# Patient Record
Sex: FEMALE | Race: WHITE | NOT HISPANIC OR LATINO | Employment: OTHER | URBAN - METROPOLITAN AREA
[De-identification: names, ages, dates, MRNs, and addresses within clinical notes are randomized per-mention and may not be internally consistent; named-entity substitution may affect disease eponyms.]

---

## 2018-06-13 ENCOUNTER — TRANSCRIBE ORDERS (OUTPATIENT)
Dept: ADMINISTRATIVE | Facility: HOSPITAL | Age: 68
End: 2018-06-13

## 2018-06-13 DIAGNOSIS — Z12.39 ENCOUNTER FOR OTHER SCREENING FOR MALIGNANT NEOPLASM OF BREAST: ICD-10-CM

## 2018-06-13 DIAGNOSIS — M81.0 SENILE OSTEOPOROSIS: Primary | ICD-10-CM

## 2018-06-13 DIAGNOSIS — Z00.00 ROUTINE GENERAL MEDICAL EXAMINATION AT A HEALTH CARE FACILITY: Primary | ICD-10-CM

## 2018-06-13 DIAGNOSIS — Z00.00 ROUTINE GENERAL MEDICAL EXAMINATION AT A HEALTH CARE FACILITY: ICD-10-CM

## 2022-10-17 ENCOUNTER — OFFICE VISIT (OUTPATIENT)
Dept: SURGERY | Facility: CLINIC | Age: 72
End: 2022-10-17

## 2022-10-17 VITALS
DIASTOLIC BLOOD PRESSURE: 72 MMHG | HEART RATE: 72 BPM | HEIGHT: 62 IN | BODY MASS INDEX: 26.87 KG/M2 | SYSTOLIC BLOOD PRESSURE: 128 MMHG | RESPIRATION RATE: 20 BRPM | WEIGHT: 146 LBS

## 2022-10-17 DIAGNOSIS — R19.7 DIARRHEA, UNSPECIFIED TYPE: ICD-10-CM

## 2022-10-17 DIAGNOSIS — K92.1 HEMATOCHEZIA: ICD-10-CM

## 2022-10-17 DIAGNOSIS — R15.9 INCONTINENCE OF FECES, UNSPECIFIED FECAL INCONTINENCE TYPE: Primary | ICD-10-CM

## 2022-10-17 PROCEDURE — 99203 OFFICE O/P NEW LOW 30 MIN: CPT | Performed by: SURGERY

## 2022-10-17 NOTE — H&P
Anita Neves 72 y.o. female presents @ the req of Dr. Hung for fecal incontinence.  Pt states she leaks stool, mucus, or blood all the time. Pt does not remember date of last c-scope. Pt also c/o bloating.           HPI   Above-noted agree.  This very pleasant 72-year-old female is here with some bowel issues.  She said about 1-1/2 years ago she developed issues with leakage.  She has to wear a pad because she leaks mucus all day.  She does move her bowels at least 2-3 times per day.  She occasionally has diarrhea as well.  She also has some bloating.  She tolerates a regular diet without nausea or vomiting.  She does not smoke or use tobacco products.  She had a colonoscopy greater than 10 years ago and she believes a polyp may have been removed.  She has no abdominal pain.  She has no other complaints.      Review of Systems   All other systems reviewed and are negative.            Current Outpatient Medications:   •  alendronate (FOSAMAX) 70 MG tablet, TAKE ONE TABLET BY MOUTH with 8oz of water on an empty stomach every Sunday, Disp: , Rfl:   •  ascorbic acid (QC Vitamin C) 1000 MG tablet, Take 1 tablet by mouth Daily., Disp: , Rfl:   •  aspirin 81 MG EC tablet, Take 1 tablet by mouth Daily., Disp: , Rfl:   •  atorvastatin (LIPITOR) 80 MG tablet, Take 80 mg by mouth Daily., Disp: , Rfl:   •  baclofen (LIORESAL) 10 MG tablet, TAKE ONE TABLET BY MOUTH THREE TIMES DAILY AS NEEDED FOR spasm, Disp: , Rfl:   •  ezetimibe (ZETIA) 10 MG tablet, Take 10 mg by mouth Daily., Disp: , Rfl:   •  famotidine (PEPCID) 20 MG tablet, Take 40 mg by mouth Daily., Disp: , Rfl:   •  gabapentin (NEURONTIN) 100 MG capsule, TAKE ONE CAPSULE BY MOUTH THREE TIMES DAILY FOR 30 DAYS, Disp: , Rfl:   •  Jardiance 10 MG tablet tablet, Take 10 mg by mouth Daily., Disp: , Rfl:   •  magnesium gluconate (MAGONATE) 500 MG tablet, Take 1 tablet by mouth 2 (Two) Times a Day., Disp: , Rfl:   •  METOPROLOL SUCCINATE PO, Take  by mouth., Disp: ,  Rfl:   •  montelukast (SINGULAIR) 10 MG tablet, Take 10 mg by mouth Daily., Disp: , Rfl:   •  Multiple Vitamins-Minerals (MULTI COMPLETE PO), Take  by mouth., Disp: , Rfl:   •  Omega-3 Fatty Acids (OMEGA 3 500 PO), Take  by mouth., Disp: , Rfl:   •  Onglyza 5 MG tablet, Take 5 mg by mouth Daily., Disp: , Rfl:   •  pioglitazone (ACTOS) 30 MG tablet, Take 1 tablet by mouth Daily., Disp: , Rfl:   •  potassium chloride (K-DUR,KLOR-CON) 20 MEQ CR tablet, Take 20 mEq by mouth Daily., Disp: , Rfl:   •  triamterene-hydrochlorothiazide (MAXZIDE-25) 37.5-25 MG per tablet, Take 1 tablet by mouth Daily., Disp: , Rfl:   •  TURMERIC PO, Take  by mouth., Disp: , Rfl:   •  Vitamin E ([employer] Vitamin E) 180 MG (400 UNIT) capsule capsule, Take  by mouth., Disp: , Rfl:         No Known Allergies        Past Medical History:   Diagnosis Date   • Diabetes (HCC)    • HLD (hyperlipidemia)    • Seasonal allergies            Past Surgical History:   Procedure Laterality Date   • BACK SURGERY     • BLADDER SUSPENSION     • COLONOSCOPY     • WRIST SURGERY             Social History     Tobacco Use   • Smoking status: Former     Types: Cigarettes   • Smokeless tobacco: Never   Vaping Use   • Vaping Use: Never used   Substance Use Topics   • Alcohol use: Never   • Drug use: Defer           Immunization History   Administered Date(s) Administered   • COVID-19 (ROCKY) 03/17/2021   • COVID-19 (PFIZER) BIVALENT BOOSTER 12+YRS 09/22/2022           Physical Exam  Vitals and nursing note reviewed.   Constitutional:       Appearance: Normal appearance.   HENT:      Head: Normocephalic and atraumatic.   Cardiovascular:      Rate and Rhythm: Normal rate and regular rhythm.   Pulmonary:      Effort: Pulmonary effort is normal.      Breath sounds: Normal breath sounds.   Abdominal:      General: Bowel sounds are normal.      Palpations: Abdomen is soft.   Musculoskeletal:         General: No swelling or tenderness.   Skin:     General: Skin is warm and dry.  "  Neurological:      General: No focal deficit present.      Mental Status: She is alert and oriented to person, place, and time.   Psychiatric:         Mood and Affect: Mood normal.         Behavior: Behavior normal.         Debilities/Disabilities Identified: None    Emotional Behavior: Appropriate      /72   Pulse 72   Resp 20   Ht 157.5 cm (62\")   Wt 66.2 kg (146 lb)   BMI 26.70 kg/m²         Diagnoses and all orders for this visit:    1. Incontinence of feces, unspecified fecal incontinence type (Primary)    2. Hematochezia    3. Diarrhea, unspecified type    We will get Anita scheduled for a colonoscopy.  I discussed with the patient the benefits and risks of performing endoscopy.  Benefits and risks were not limited to but including bleeding, infection, perforation, complications of anesthesia, aspiration.  The patient appeared to understand and is willing to proceed.    Thank you for allowing me to participate in the care of this interesting patient.            "

## 2022-10-21 ENCOUNTER — PATIENT ROUNDING (BHMG ONLY) (OUTPATIENT)
Dept: SURGERY | Facility: CLINIC | Age: 72
End: 2022-10-21

## 2022-10-21 NOTE — PROGRESS NOTES
October 21, 2022    Hello, may I speak with Anita Neves?    My name is Le Fletcher      I am  with [facility] SURG[facility] GENERAL SURG[facility]  [address] [address] [phone].    Before we get started may I verify your date of birth? 1950    I am calling to officially welcome you to our practice and ask about your recent visit. Is this a good time to talk? no    LVM for pt to call back with questions or concerns

## 2022-10-31 ENCOUNTER — ANESTHESIA EVENT (OUTPATIENT)
Dept: PERIOP | Facility: HOSPITAL | Age: 72
End: 2022-10-31

## 2022-11-01 ENCOUNTER — ANESTHESIA (OUTPATIENT)
Dept: PERIOP | Facility: HOSPITAL | Age: 72
End: 2022-11-01

## 2022-11-01 ENCOUNTER — ANESTHESIA EVENT (OUTPATIENT)
Dept: PERIOP | Facility: HOSPITAL | Age: 72
End: 2022-11-01

## 2022-11-01 ENCOUNTER — HOSPITAL ENCOUNTER (OUTPATIENT)
Facility: HOSPITAL | Age: 72
Discharge: HOME | End: 2022-11-02
Attending: SURGERY | Admitting: SURGERY

## 2022-11-01 DIAGNOSIS — R15.9 INCONTINENCE OF FECES, UNSPECIFIED FECAL INCONTINENCE TYPE: ICD-10-CM

## 2022-11-01 DIAGNOSIS — R19.7 DIARRHEA, UNSPECIFIED TYPE: ICD-10-CM

## 2022-11-01 DIAGNOSIS — K92.1 HEMATOCHEZIA: ICD-10-CM

## 2022-11-01 PROBLEM — K62.5 RECTAL BLEED: Status: ACTIVE | Noted: 2022-11-01

## 2022-11-01 LAB
ABO GROUP BLD: NORMAL
ABO GROUP BLD: NORMAL
BLD GP AB SCN SERPL QL: NEGATIVE
GLUCOSE BLDC GLUCOMTR-MCNC: 168 MG/DL (ref 70–130)
GLUCOSE BLDC GLUCOMTR-MCNC: 355 MG/DL (ref 70–130)
GLUCOSE BLDC GLUCOMTR-MCNC: 98 MG/DL (ref 70–130)
HBA1C MFR BLD: 9.5 % (ref 4.8–5.6)
HCT VFR BLD AUTO: 34.1 % (ref 34–46.6)
HCT VFR BLD AUTO: 44.2 % (ref 34–46.6)
HGB BLD-MCNC: 11.5 G/DL (ref 12–15.9)
HGB BLD-MCNC: 14.6 G/DL (ref 12–15.9)
RH BLD: NEGATIVE
RH BLD: NEGATIVE
T&S EXPIRATION DATE: NORMAL

## 2022-11-01 PROCEDURE — A9270 NON-COVERED ITEM OR SERVICE: HCPCS | Performed by: SURGERY

## 2022-11-01 PROCEDURE — 25010000002 ALBUMIN HUMAN 5% PER 50 ML: Performed by: NURSE ANESTHETIST, CERTIFIED REGISTERED

## 2022-11-01 PROCEDURE — 45385 COLONOSCOPY W/LESION REMOVAL: CPT | Performed by: SURGERY

## 2022-11-01 PROCEDURE — 88305 TISSUE EXAM BY PATHOLOGIST: CPT | Performed by: SURGERY

## 2022-11-01 PROCEDURE — 25010000002 PROPOFOL 200 MG/20ML EMULSION: Performed by: NURSE ANESTHETIST, CERTIFIED REGISTERED

## 2022-11-01 PROCEDURE — A9270 NON-COVERED ITEM OR SERVICE: HCPCS | Performed by: INTERNAL MEDICINE

## 2022-11-01 PROCEDURE — 63710000001 GABAPENTIN 100 MG CAPSULE: Performed by: SURGERY

## 2022-11-01 PROCEDURE — 63710000001 FAMOTIDINE 20 MG TABLET: Performed by: SURGERY

## 2022-11-01 PROCEDURE — 86900 BLOOD TYPING SEROLOGIC ABO: CPT | Performed by: SURGERY

## 2022-11-01 PROCEDURE — 63710000001 ATENOLOL 50 MG TABLET: Performed by: SURGERY

## 2022-11-01 PROCEDURE — G0378 HOSPITAL OBSERVATION PER HR: HCPCS

## 2022-11-01 PROCEDURE — 86900 BLOOD TYPING SEROLOGIC ABO: CPT

## 2022-11-01 PROCEDURE — 63710000001 POTASSIUM CHLORIDE 20 MEQ TABLET CONTROLLED-RELEASE: Performed by: SURGERY

## 2022-11-01 PROCEDURE — 85014 HEMATOCRIT: CPT | Performed by: SURGERY

## 2022-11-01 PROCEDURE — 82962 GLUCOSE BLOOD TEST: CPT

## 2022-11-01 PROCEDURE — P9041 ALBUMIN (HUMAN),5%, 50ML: HCPCS | Performed by: NURSE ANESTHETIST, CERTIFIED REGISTERED

## 2022-11-01 PROCEDURE — 86850 RBC ANTIBODY SCREEN: CPT | Performed by: SURGERY

## 2022-11-01 PROCEDURE — 99203 OFFICE O/P NEW LOW 30 MIN: CPT | Performed by: INTERNAL MEDICINE

## 2022-11-01 PROCEDURE — 63710000001 PIOGLITAZONE 30 MG TABLET: Performed by: SURGERY

## 2022-11-01 PROCEDURE — 86901 BLOOD TYPING SEROLOGIC RH(D): CPT | Performed by: SURGERY

## 2022-11-01 PROCEDURE — 63710000001 LINAGLIPTIN 5 MG TABLET: Performed by: SURGERY

## 2022-11-01 PROCEDURE — 85018 HEMOGLOBIN: CPT | Performed by: SURGERY

## 2022-11-01 PROCEDURE — 63710000001 INSULIN ASPART PER 5 UNITS: Performed by: INTERNAL MEDICINE

## 2022-11-01 PROCEDURE — 25010000002 CHLOROPROCAINE HCL (PF) 3 % SOLUTION: Performed by: NURSE ANESTHETIST, CERTIFIED REGISTERED

## 2022-11-01 PROCEDURE — 86901 BLOOD TYPING SEROLOGIC RH(D): CPT

## 2022-11-01 PROCEDURE — 63710000001 EMPAGLIFLOZIN 10 MG TABLET: Performed by: SURGERY

## 2022-11-01 PROCEDURE — 46614 ANOSCOPY CONTROL BLEEDING: CPT | Performed by: SURGERY

## 2022-11-01 PROCEDURE — 45380 COLONOSCOPY AND BIOPSY: CPT | Performed by: SURGERY

## 2022-11-01 PROCEDURE — 25010000002 CEFAZOLIN PER 500 MG: Performed by: NURSE ANESTHETIST, CERTIFIED REGISTERED

## 2022-11-01 PROCEDURE — 63710000001 TRIAMTERENE-HYDROCHLOROTHIAZIDE 37.5-25 MG TABLET: Performed by: SURGERY

## 2022-11-01 PROCEDURE — 83036 HEMOGLOBIN GLYCOSYLATED A1C: CPT | Performed by: INTERNAL MEDICINE

## 2022-11-01 DEVICE — FLOSEAL HEMOSTATIC MATRIX, 5ML
Type: IMPLANTABLE DEVICE | Site: PERIANAL | Status: FUNCTIONAL
Brand: FLOSEAL HEMOSTATIC MATRIX

## 2022-11-01 DEVICE — HEMOST ABS SURGIFOAM SZ100 8X12 10MM: Type: IMPLANTABLE DEVICE | Site: PERIANAL | Status: FUNCTIONAL

## 2022-11-01 RX ORDER — SODIUM CHLORIDE, SODIUM LACTATE, POTASSIUM CHLORIDE, CALCIUM CHLORIDE 600; 310; 30; 20 MG/100ML; MG/100ML; MG/100ML; MG/100ML
100 INJECTION, SOLUTION INTRAVENOUS CONTINUOUS
Status: DISCONTINUED | OUTPATIENT
Start: 2022-11-01 | End: 2022-11-01

## 2022-11-01 RX ORDER — FAMOTIDINE 20 MG/1
40 TABLET, FILM COATED ORAL DAILY
Status: DISCONTINUED | OUTPATIENT
Start: 2022-11-01 | End: 2022-11-02 | Stop reason: HOSPADM

## 2022-11-01 RX ORDER — FENTANYL CITRATE 50 UG/ML
25 INJECTION, SOLUTION INTRAMUSCULAR; INTRAVENOUS
Status: DISCONTINUED | OUTPATIENT
Start: 2022-11-01 | End: 2022-11-01 | Stop reason: HOSPADM

## 2022-11-01 RX ORDER — SODIUM CHLORIDE 0.9 % (FLUSH) 0.9 %
10 SYRINGE (ML) INJECTION EVERY 12 HOURS SCHEDULED
Status: DISCONTINUED | OUTPATIENT
Start: 2022-11-01 | End: 2022-11-01 | Stop reason: HOSPADM

## 2022-11-01 RX ORDER — ONDANSETRON 2 MG/ML
4 INJECTION INTRAMUSCULAR; INTRAVENOUS ONCE AS NEEDED
Status: DISCONTINUED | OUTPATIENT
Start: 2022-11-01 | End: 2022-11-01 | Stop reason: HOSPADM

## 2022-11-01 RX ORDER — POTASSIUM CHLORIDE 20 MEQ/1
20 TABLET, EXTENDED RELEASE ORAL DAILY
Status: DISCONTINUED | OUTPATIENT
Start: 2022-11-01 | End: 2022-11-02 | Stop reason: HOSPADM

## 2022-11-01 RX ORDER — ALBUMIN, HUMAN INJ 5% 5 %
SOLUTION INTRAVENOUS CONTINUOUS PRN
Status: DISCONTINUED | OUTPATIENT
Start: 2022-11-01 | End: 2022-11-01 | Stop reason: SURG

## 2022-11-01 RX ORDER — LIDOCAINE HYDROCHLORIDE AND EPINEPHRINE 20; 5 MG/ML; UG/ML
INJECTION, SOLUTION EPIDURAL; INFILTRATION; INTRACAUDAL; PERINEURAL AS NEEDED
Status: DISCONTINUED | OUTPATIENT
Start: 2022-11-01 | End: 2022-11-01 | Stop reason: HOSPADM

## 2022-11-01 RX ORDER — SODIUM CHLORIDE 9 MG/ML
40 INJECTION, SOLUTION INTRAVENOUS AS NEEDED
Status: DISCONTINUED | OUTPATIENT
Start: 2022-11-01 | End: 2022-11-01 | Stop reason: HOSPADM

## 2022-11-01 RX ORDER — LIDOCAINE HYDROCHLORIDE 10 MG/ML
0.5 INJECTION, SOLUTION EPIDURAL; INFILTRATION; INTRACAUDAL; PERINEURAL ONCE AS NEEDED
Status: COMPLETED | OUTPATIENT
Start: 2022-11-01 | End: 2022-11-01

## 2022-11-01 RX ORDER — GABAPENTIN 100 MG/1
100 CAPSULE ORAL EVERY 8 HOURS SCHEDULED
Status: DISCONTINUED | OUTPATIENT
Start: 2022-11-01 | End: 2022-11-02 | Stop reason: HOSPADM

## 2022-11-01 RX ORDER — HYDROCORTISONE 25 MG/G
CREAM TOPICAL AS NEEDED
Status: DISCONTINUED | OUTPATIENT
Start: 2022-11-01 | End: 2022-11-01 | Stop reason: HOSPADM

## 2022-11-01 RX ORDER — SODIUM CHLORIDE 0.9 % (FLUSH) 0.9 %
10 SYRINGE (ML) INJECTION AS NEEDED
Status: DISCONTINUED | OUTPATIENT
Start: 2022-11-01 | End: 2022-11-01 | Stop reason: HOSPADM

## 2022-11-01 RX ORDER — DEXTROSE MONOHYDRATE 25 G/50ML
25 INJECTION, SOLUTION INTRAVENOUS
Status: DISCONTINUED | OUTPATIENT
Start: 2022-11-01 | End: 2022-11-02 | Stop reason: HOSPADM

## 2022-11-01 RX ORDER — INSULIN ASPART 100 [IU]/ML
0-9 INJECTION, SOLUTION INTRAVENOUS; SUBCUTANEOUS
Status: DISCONTINUED | OUTPATIENT
Start: 2022-11-01 | End: 2022-11-02 | Stop reason: HOSPADM

## 2022-11-01 RX ORDER — PIOGLITAZONEHYDROCHLORIDE 30 MG/1
30 TABLET ORAL DAILY
Status: DISCONTINUED | OUTPATIENT
Start: 2022-11-01 | End: 2022-11-01

## 2022-11-01 RX ORDER — CEFAZOLIN SODIUM 1 G/3ML
INJECTION, POWDER, FOR SOLUTION INTRAMUSCULAR; INTRAVENOUS AS NEEDED
Status: DISCONTINUED | OUTPATIENT
Start: 2022-11-01 | End: 2022-11-01 | Stop reason: SURG

## 2022-11-01 RX ORDER — MAGNESIUM HYDROXIDE 1200 MG/15ML
LIQUID ORAL AS NEEDED
Status: DISCONTINUED | OUTPATIENT
Start: 2022-11-01 | End: 2022-11-01 | Stop reason: HOSPADM

## 2022-11-01 RX ORDER — NICOTINE POLACRILEX 4 MG
15 LOZENGE BUCCAL
Status: DISCONTINUED | OUTPATIENT
Start: 2022-11-01 | End: 2022-11-02 | Stop reason: HOSPADM

## 2022-11-01 RX ORDER — TRIAMTERENE AND HYDROCHLOROTHIAZIDE 37.5; 25 MG/1; MG/1
1 TABLET ORAL DAILY
Status: DISCONTINUED | OUTPATIENT
Start: 2022-11-01 | End: 2022-11-02 | Stop reason: HOSPADM

## 2022-11-01 RX ORDER — ATENOLOL 50 MG/1
50 TABLET ORAL
Status: DISCONTINUED | OUTPATIENT
Start: 2022-11-01 | End: 2022-11-02 | Stop reason: HOSPADM

## 2022-11-01 RX ORDER — CHLOROPROCAINE HYDROCHLORIDE 30 MG/ML
INJECTION, SOLUTION EPIDURAL; INFILTRATION; INTRACAUDAL; PERINEURAL AS NEEDED
Status: DISCONTINUED | OUTPATIENT
Start: 2022-11-01 | End: 2022-11-01 | Stop reason: SURG

## 2022-11-01 RX ORDER — PROPOFOL 10 MG/ML
INJECTION, EMULSION INTRAVENOUS AS NEEDED
Status: DISCONTINUED | OUTPATIENT
Start: 2022-11-01 | End: 2022-11-01 | Stop reason: SURG

## 2022-11-01 RX ORDER — SODIUM CHLORIDE, SODIUM LACTATE, POTASSIUM CHLORIDE, CALCIUM CHLORIDE 600; 310; 30; 20 MG/100ML; MG/100ML; MG/100ML; MG/100ML
9 INJECTION, SOLUTION INTRAVENOUS CONTINUOUS
Status: DISCONTINUED | OUTPATIENT
Start: 2022-11-01 | End: 2022-11-01

## 2022-11-01 RX ADMIN — SODIUM CHLORIDE, POTASSIUM CHLORIDE, SODIUM LACTATE AND CALCIUM CHLORIDE 9 ML/HR: 600; 310; 30; 20 INJECTION, SOLUTION INTRAVENOUS at 11:48

## 2022-11-01 RX ADMIN — LIDOCAINE HYDROCHLORIDE 50 ML: 10 INJECTION, SOLUTION EPIDURAL; INFILTRATION; INTRACAUDAL; PERINEURAL at 09:52

## 2022-11-01 RX ADMIN — FAMOTIDINE 40 MG: 20 TABLET ORAL at 15:56

## 2022-11-01 RX ADMIN — PROPOFOL 50 MG: 10 INJECTION, EMULSION INTRAVENOUS at 10:11

## 2022-11-01 RX ADMIN — PIOGLITAZONE HYDROCHLORIDE 30 MG: 30 TABLET ORAL at 15:56

## 2022-11-01 RX ADMIN — PROPOFOL 50 MG: 10 INJECTION, EMULSION INTRAVENOUS at 10:21

## 2022-11-01 RX ADMIN — GABAPENTIN 100 MG: 100 CAPSULE ORAL at 15:56

## 2022-11-01 RX ADMIN — ALBUMIN (HUMAN): 12.5 SOLUTION INTRAVENOUS at 12:12

## 2022-11-01 RX ADMIN — LINAGLIPTIN 5 MG: 5 TABLET, FILM COATED ORAL at 15:56

## 2022-11-01 RX ADMIN — PROPOFOL 50 MG: 10 INJECTION, EMULSION INTRAVENOUS at 10:08

## 2022-11-01 RX ADMIN — INSULIN ASPART 8 UNITS: 100 INJECTION, SOLUTION INTRAVENOUS; SUBCUTANEOUS at 17:43

## 2022-11-01 RX ADMIN — PROPOFOL 50 MG: 10 INJECTION, EMULSION INTRAVENOUS at 10:31

## 2022-11-01 RX ADMIN — TRIAMTERENE AND HYDROCHLOROTHIAZIDE 1 TABLET: 37.5; 25 TABLET ORAL at 15:56

## 2022-11-01 RX ADMIN — CEFAZOLIN 2 G: 1 INJECTION, POWDER, FOR SOLUTION INTRAMUSCULAR; INTRAVENOUS at 12:18

## 2022-11-01 RX ADMIN — SODIUM CHLORIDE, POTASSIUM CHLORIDE, SODIUM LACTATE AND CALCIUM CHLORIDE 9 ML/HR: 600; 310; 30; 20 INJECTION, SOLUTION INTRAVENOUS at 09:39

## 2022-11-01 RX ADMIN — EMPAGLIFLOZIN 10 MG: 10 TABLET, FILM COATED ORAL at 15:56

## 2022-11-01 RX ADMIN — PROPOFOL 50 MG: 10 INJECTION, EMULSION INTRAVENOUS at 10:04

## 2022-11-01 RX ADMIN — PROPOFOL 50 MG: 10 INJECTION, EMULSION INTRAVENOUS at 10:27

## 2022-11-01 RX ADMIN — ATENOLOL 50 MG: 50 TABLET ORAL at 15:56

## 2022-11-01 RX ADMIN — PROPOFOL 75 MG: 10 INJECTION, EMULSION INTRAVENOUS at 09:54

## 2022-11-01 RX ADMIN — PROPOFOL 25 MG: 10 INJECTION, EMULSION INTRAVENOUS at 09:57

## 2022-11-01 RX ADMIN — POTASSIUM CHLORIDE 20 MEQ: 1500 TABLET, EXTENDED RELEASE ORAL at 15:56

## 2022-11-01 RX ADMIN — PROPOFOL 50 MG: 10 INJECTION, EMULSION INTRAVENOUS at 10:00

## 2022-11-01 RX ADMIN — PROPOFOL 50 MG: 10 INJECTION, EMULSION INTRAVENOUS at 10:14

## 2022-11-01 RX ADMIN — PROPOFOL 50 MG: 10 INJECTION, EMULSION INTRAVENOUS at 10:34

## 2022-11-01 RX ADMIN — PROPOFOL 50 MG: 10 INJECTION, EMULSION INTRAVENOUS at 10:18

## 2022-11-01 RX ADMIN — CHLOROPROCAINE HYDROCHLORIDE 2 MG: 30 INJECTION, SOLUTION EPIDURAL; INFILTRATION; INTRACAUDAL; PERINEURAL at 11:52

## 2022-11-01 RX ADMIN — GABAPENTIN 100 MG: 100 CAPSULE ORAL at 21:10

## 2022-11-01 NOTE — CONSULTS
[facility] HOSPITALIST CONSULT NOTE    Padmini King APRN    CONSULT REASON:  Medical management    HISTORY OF PRESENT ILLNESS:    Patient is a 72-year-old female with past medical history significant for diabetes mellitus type 2 non-insulin-requiring, obesity, hyperlipidemia, hypertension.  She presented to [facility] ED under care of Dr. Potts for colonoscopy procedure today.  Patient developed post colonoscopy rectal bleed and therefore was admitted to the hospital for observation.  Patient reports a history of diabetes, hypertension hyperlipidemia as per above.  She takes only oral hypoglycemic agents at home.  We discussed need for insulin while inpatient.  Patient denies abdominal pain, nausea, vomiting, diarrhea.       Past Medical History:   Diagnosis Date   • Diabetes mellitus (HCC)    • Hyperlipidemia    • Hypertension    • Insomnia    • Irritable bowel disease    • Kidney stone      Past Surgical History:   Procedure Laterality Date   • ENDOSCOPY     • ENDOSCOPY N/A 12/31/2019    Procedure: ESOPHAGOGASTRODUODENOSCOPY WITH BIOPSY;  Surgeon: Deniz Zeng Jr., MD;  [facility]: [facility] ENDOSCOPY;  Service: General   • GASTRIC BYPASS     • LASIK     • WRIST FRACTURE SURGERY Left      Family History   Problem Relation Age of Onset   • Diabetes Mother    • Sleep apnea Mother    • Obesity Father    • Hypertension Father    • Heart attack Father    • Obesity Brother    • Hypertension Brother      Social History     Tobacco Use   • Smoking status: Never Smoker   • Smokeless tobacco: Never Used   Vaping Use   • Vaping Use: Never used   Substance Use Topics   • Alcohol use: Yes     Comment: once every 3 months   • Drug use: No     Medications Prior to Admission   Medication Sig Dispense Refill Last Dose   • colestipol (COLESTID) 1 g tablet Take 1 tablet by mouth 2 (Two) Times a Day. 60 tablet 6 Past Week at Unknown time   • desonide (DESOWEN) 0.05 % cream APPLY 1 CREAM  TOPICALLY TWICE DAILY   4/21/2022 at Unknown time   • melatonin 5 MG tablet tablet Take 5 mg by mouth Every Night.   4/21/2022 at Unknown time   • multivitamin with minerals tablet tablet Take 1 tablet by mouth Daily.   4/21/2022 at Unknown time   • propranolol LA (Inderal LA) 60 MG 24 hr capsule Take 1 capsule by mouth Every Night. 30 capsule 0 4/21/2022 at Unknown time   • SITagliptin (Januvia) 100 MG tablet Take 1 tablet by mouth Daily. For diabetes 30 tablet 0 4/21/2022 at Unknown time   • venlafaxine 225 MG tablet sustained-release 24 hour 24 hr tablet Take 1 tablet by mouth Daily. 90 each 1 4/21/2022 at Unknown time   • zolpidem (AMBIEN) 10 MG tablet Take 1 tablet by mouth At Night As Needed for Sleep. 90 tablet 0 4/21/2022 at Unknown time   • dicyclomine (BENTYL) 20 MG tablet Take 1 tablet by mouth 3 (Three) Times a Day. 90 tablet 3 4/20/2022     Allergies:  Glucophage [metformin] and Trazodone    Immunization History   Administered Date(s) Administered   • COVID-19 (MODERNA) 1st, 2nd, 3rd Dose Only 01/05/2021, 02/02/2021   • Flu Vaccine Intradermal Quad 18-64YR 10/13/2017   • Flu Vaccine Quad PF >36MO 10/19/2016, 10/15/2019, 11/12/2020   • FluLaval/Fluarix/Fluzone >6 10/15/2019   • Hepatitis A 05/11/2018   • Pneumococcal Polysaccharide (PPSV23) 10/19/2016   • Tdap 10/19/2016       REVIEW OF SYSTEMS:  Please see the above history of present illness for pertinent positives and negatives.  The remainder of the patient's systems have been reviewed and are negative.     Vital Signs  Temp:  [98.2 °F (36.8 °C)] 98.2 °F (36.8 °C)  Heart Rate:  [76] 76  Resp:  [20] 20  BP: (118)/(87) 118/87  Flowsheet Rows      Flowsheet Row First Filed Value   Admission Height --   Admission Weight 146 kg (321 lb 12.8 oz) Documented at 04/22/2022 0740             Physical Exam:  General: Patient is awake and alert.  Obese elderly female. No acute distress noted.   HENT: Head is atraumatic, normocephalic. Hearing is grossly intact.  Nose is without obvious congestion and appears patent. Neck is supple and trachea is midline.   Eyes: Vision is grossly intact. Pupils appear equal and round.   Cardiovascular: Heart has regular rate and rhythm with no murmurs, rubs or gallops noted.   Respiratory: Lungs are clear to ausculation without wheezes, rhonchi or rales.   Abdominal/GI: Soft, nontender, bowel sounds present. No rebound or guarding present.   Extremities: No peripheral edema noted.   Musculoskeletal: Spontaneous movement of bilateral upper and lower extremities against gravity noted. No signs of injury or deformity noted.   Skin: Warm and dry.   Psych: Mood and affect are appropriate. Cooperative with exam.   Neuro: No facial asymmetry noted. No focal deficits noted, hearing and vision are grossly intact.       Results Review:    I reviewed the patient's new clinical results.  Lab Results (most recent)       Procedure Component Value Units Date/Time    POC Glucose Once [[id number]]  (Abnormal) Collected: 04/22/22 0809    Specimen: Blood Updated: 04/22/22 0816     Glucose 183 mg/dL      Comment: Meter: [id number] : [id number] Alondra Huertas RN (PRN)               Imaging Results (Most Recent)       None          None    ECG/EMG Results (most recent)       None              Assessment/Plan   DM2 in obese female with diabetic peripheral neuropathy:  Hold home regimen   Add accuchecks every 6 hours, low dose SSI  -Continue home gabapentin  -Check A1c.     HTN: Continue home Maxide metoprolol succinate    Osteoporosis-continue Fosamax on discharge    Hyperlipidemia-continue atorvastatin    GERD-continue Pepcid    Seasonal allergic rhinitis-hold home Singulair    Post colonoscopy rectal bleed-management as per general surgery    I discussed the patient's findings and my recommendations with patient..     Flavio Peguero DO  10/17/2022   17:24 EDT     At [facility], we believe that sharing information builds trust and better  "relationships. You are receiving this note because you recently visited [facility]. It is possible you will see health information before a provider has talked with you about it. This kind of information can be easy to misunderstand. To help you fully understand what it means for your health, we urge you to discuss this note with your provider.     \"Dictated utilizing Dragon dictation\"        "

## 2022-11-01 NOTE — NURSING NOTE
Patients rectum had much bleeding with large clots- 4 more changes of pads in addition to first 2- large amount of blood- dark red- with clots- last 2 changes had more blood and less clots- dr morales was notified

## 2022-11-01 NOTE — ANESTHESIA PREPROCEDURE EVALUATION
Anesthesia Evaluation     Patient summary reviewed and Nursing notes reviewed   no history of anesthetic complications:  NPO Solid Status: > 8 hours  NPO Liquid Status: > 8 hours           Airway   Mallampati: II  TM distance: >3 FB  Neck ROM: full  No difficulty expected  Dental      Pulmonary - negative pulmonary ROS    breath sounds clear to auscultation  Cardiovascular   Exercise tolerance: good (4-7 METS)    ECG reviewed  PT is on anticoagulation therapy  Rhythm: regular  Rate: normal    (+) dysrhythmias Tachycardia, hyperlipidemia,       Neuro/Psych- negative ROS  GI/Hepatic/Renal/Endo    (+)   diabetes mellitus type 2 well controlled,     Musculoskeletal (-) negative ROS    Abdominal    Substance History - negative use     OB/GYN          Other - negative ROS                       Anesthesia Plan    ASA 2     MAC     intravenous induction     Anesthetic plan, risks, benefits, and alternatives have been provided, discussed and informed consent has been obtained with: patient.    Use of blood products discussed with patient  Consented to blood products.       CODE STATUS:

## 2022-11-01 NOTE — ANESTHESIA POSTPROCEDURE EVALUATION
Patient: Anita Neves    Procedure Summary     Date: 11/01/22 Room / Location: [facility] OR 1 /  [facility] OR    Anesthesia Start: 1200 Anesthesia Stop: 1249    Procedure: RECTAL EXAM UNDER ANESTHESIA, CONTROL OF BLEEDING (Anus) Diagnosis: (Rectal bleeding)    Surgeons: Angy Potts DO Provider: Abigail Magdaleno CRNA    Anesthesia Type: spinal ASA Status: 2 - Emergent          Anesthesia Type: spinal    Vitals  Vitals Value Taken Time   /67 11/01/22 1340   Temp 97.5 °F (36.4 °C) 11/01/22 1305   Pulse 80 11/01/22 1343   Resp 12 11/01/22 1340   SpO2 97 % 11/01/22 1343   Vitals shown include unvalidated device data.        Post Anesthesia Care and Evaluation    Patient location during evaluation: PACU  Patient participation: complete - patient participated  Level of consciousness: awake  Pain management: adequate    Airway patency: patent  Anesthetic complications: No anesthetic complications  PONV Status: none  Cardiovascular status: acceptable  Respiratory status: acceptable  Hydration status: acceptable       Topical Ketoconazole Counseling: Patient counseled that this medication may cause skin irritation or allergic reactions.  In the event of skin irritation, the patient was advised to reduce the amount of the drug applied or use it less frequently.   The patient verbalized understanding of the proper use and possible adverse effects of ketoconazole.  All of the patient's questions and concerns were addressed.

## 2022-11-01 NOTE — ANESTHESIA PROCEDURE NOTES
Spinal Block      Patient reassessed immediately prior to procedure    Patient location during procedure: pre-op  Start Time: 11/1/2022 11:50 AM  Stop Time: 11/1/2022 11:52 AM  Indication:at surgeon's request and procedure for pain  Performed By  CRNA/CAA: Abigail Magdaleno CRNA  Preanesthetic Checklist  Completed: patient identified, IV checked, site marked, risks and benefits discussed, surgical consent, monitors and equipment checked, pre-op evaluation and timeout performed  Spinal Block Prep:  Patient Position:left lateral decubitus  Sterile Tech:cap, gloves, mask and sterile barriers  Prep:Betadine  Patient Monitoring:blood pressure monitoring, continuous pulse oximetry and EKG    Spinal Block Procedure  Approach:midline  Guidance:landmark technique and palpation technique  Location:L3-L4  Needle Type:Pencan  Needle Gauge:27 G  Placement of Spinal needle event:cerebrospinal fluid aspirated  Paresthesia: no  Fluid Appearance:clear     Post Assessment  Patient Tolerance:patient tolerated the procedure well with no apparent complications  Complications no  Additional Notes  Lidocaine 2% 1ml skin infiltration

## 2022-11-01 NOTE — PLAN OF CARE
Goal Outcome Evaluation:  Plan of Care Reviewed With: patient        Progress: improving  Outcome Evaluation: New admit from PACU. Colonscopy today with some bleeding. Very little blood wiped since patient came to the floor. Hospitalist consulted for diabetes management. Reed catheter taken out per MD. Up to toilet and voided. A&Ox4. SCDs in place. No complaints of pain. Daughters at bedside.

## 2022-11-01 NOTE — PROGRESS NOTES
Anita was getting ready to eat [employer].  She has some burning in her rectum but is otherwise doing well.  She has had no further bleeding.  Her blood pressure is stable.  We will remove her meyers after she eats.

## 2022-11-01 NOTE — OP NOTE
Colonoscopy Procedure Note      Pre-operative Diagnosis: Fecal incontinence, hematochezia, diarrhea    Post-operative Diagnosis: Diverticulosis, polyps of the cecum and right colon, very large rectal polyp/mass adjacent to the anal sphincters    Procedure: Colonoscopy with polypectomy using hot snare and cold forceps with biopsy of rectal mass using Bovie cautery    Surgeon: Delroy    Anesthetic: Abigail Marc CRNA    Estimated Blood Loss: 50 mL    Complications: None    Indications: See preoperative diagnosis     Findings/Treatments:  Cecum polyp-removed cold forceps  Right colon polyps x2-removed cold forceps  There were 2 large rectal polyps-one was able to be removed with hot snare the other was too large to remove with hot snare we did remove several pieces with hot snare but then attempted to remove the polyp with Bovie cautery using a rectal speculum.  Were unable to remove the entire rectal polyp/mass.  She will be referred to a colorectal surgeon for additional treatment.       Scope Withdrawal Time:  > 6 minutes      Recommendations: We will be referring Anita to colorectal surgery for further treatment of her rectal mass-pathology pending      Procedure Details     After discussing the benefits and risks of the procedure with the patient, not limited to but including:  Bleeding, infection, perforation, aspiration; informed consent was signed.  The patient was taken into the endoscopy room at [facility] and placed in the left lateral decubitus position.  MAC anesthesia was given with appropriate cardiopulmonary monitoring.  A rectal exam was performed.  Sphincter tone was normal.  The colonoscope was then inserted and carefully advanced to the cecum while visualizing the mucosa.  The cecum was identified by the ileocecal valve and the orifice of the appendix.  Once in the cecum the scope was slowly withdrawn while carefully evaluating mucosa and deflating air.  There was a  cecum polyp removed with cold forceps and 2 right colon polyps medical forceps.  In the rectum there were 2 polyps.  There was a large polyp about 5 cm from the sinkers that removed with hot snare.  There was a very large mass of the rectum that was adjacent to the sphincters.  Multiple biopsies and pieces that were obtained.  We gotten some bleeding so we used the Bovie cautery as well as Floseal and Gelfoam for hemostasis.  Once hemostasis was assured Anita was taken to the recovery area in stable postoperative condition having tolerated her procedure well.    Angy Potts,

## 2022-11-01 NOTE — ANESTHESIA POSTPROCEDURE EVALUATION
Patient: Anita Neves    Procedure Summary     Date: 11/01/22 Room / Location: [facility] ENDOSCOPY 1 /  [facility] OR    Anesthesia Start: 0951 Anesthesia Stop: 1043    Procedure: COLONOSCOPY with possible biopsy or polypectomy Diagnosis:       Incontinence of feces, unspecified fecal incontinence type      Hematochezia      Diarrhea, unspecified type      (Incontinence of feces, unspecified fecal incontinence type [R15.9])      (Hematochezia [K92.1])      (Diarrhea, unspecified type [R19.7])    Surgeons: Angy Potts DO Provider: Abigail Magdaleno CRNA    Anesthesia Type: MAC ASA Status: 2          Anesthesia Type: MAC    Vitals  Vitals Value Taken Time   /80 11/01/22 1100   Temp     Pulse 74 11/01/22 1100   Resp 12 11/01/22 1100   SpO2 97 % 11/01/22 1100           Post Anesthesia Care and Evaluation    Patient location during evaluation: PHASE II  Patient participation: complete - patient participated  Level of consciousness: awake and alert  Pain score: 0  Pain management: adequate    Airway patency: patent  Anesthetic complications: No anesthetic complications  PONV Status: none  Cardiovascular status: acceptable  Respiratory status: acceptable  Hydration status: acceptable

## 2022-11-01 NOTE — OP NOTE
GENERAL SURGERY :  Delroy  Anita Nvees  1950    Procedure Date: 11/01/22    Pre-op Diagnosis: Post colonoscopy rectal bleed    Post-op Diagnosis: Post colonoscopy rectal bleed secondary to rectal mass    Procedure: Rectal exam under anesthesia with control rectal bleeding    Surgeon: Delroy    Assistant: None    Estimated Blood Loss: Minimal bleeding in the operating room    Complications: None    Specimen: None    Findings: Anita had a rectal mass that was adjacent to the anal sphincters.  There was bleeding of the surface area of the mass.  We were able to obtain hemostasis using 2-0 chromic sutures as well as Floseal.    Clinical Note: Anita had a colonoscopy where a large rectal mass was noted.  Multiple biopsies were obtained with hot snare and using Bovie cautery.  She had significant bleeding in her recovery room so we discussed taking her back to the operating room emergently for control of hemostasis.    Procedure: Anita was given a spinal anesthesia in the preoperative area by anesthesia.  She was then brought into the operating room and placed in supine position.  She was in prepped and draped you sterile fashion.  After appropriate timeout a speculum was inserted the bleeding was noted.  Copious irrigation was carried out.  Using 2-0 chromic I ran a running suture basically connecting mucosa over the surface bleeding.  We then placed Floseal.  I then placed some simple sutures using 2-0 chromic.  We then waited 10 minutes.  No further bleeding was noted.  Anita was cleaned up and an ABD pad was placed over her anal area and she was taken to the recovery room where a Reed catheter was to be placed.  She tolerated procedure well without complication.        Angy Potts,   12:56 EDT

## 2022-11-01 NOTE — PROGRESS NOTES
Were notified by recovery that Anita was having a large amount of bleeding per rectum.  I discussed with Anita and her daughter taking her back to surgery into the OR and performing a rectal exam under anesthesia for control of rectal bleeding.  Informed consent was signed.  All other questions were answered.  Anesthesia came in to do a spinal immediately after I discussed with the family.  She will be taken to the OR as soon as the room is opened.

## 2022-11-02 VITALS
WEIGHT: 151.4 LBS | RESPIRATION RATE: 15 BRPM | TEMPERATURE: 98.7 F | HEART RATE: 75 BPM | BODY MASS INDEX: 27.86 KG/M2 | HEIGHT: 62 IN | SYSTOLIC BLOOD PRESSURE: 111 MMHG | OXYGEN SATURATION: 97 % | DIASTOLIC BLOOD PRESSURE: 58 MMHG

## 2022-11-02 LAB
GLUCOSE BLDC GLUCOMTR-MCNC: 169 MG/DL (ref 70–130)
HCT VFR BLD AUTO: 32.7 % (ref 34–46.6)
HGB BLD-MCNC: 10.8 G/DL (ref 12–15.9)
LAB AP CASE REPORT: NORMAL
PATH REPORT.FINAL DX SPEC: NORMAL
PATH REPORT.GROSS SPEC: NORMAL

## 2022-11-02 PROCEDURE — A9270 NON-COVERED ITEM OR SERVICE: HCPCS | Performed by: SURGERY

## 2022-11-02 PROCEDURE — A9270 NON-COVERED ITEM OR SERVICE: HCPCS | Performed by: INTERNAL MEDICINE

## 2022-11-02 PROCEDURE — 63710000001 FAMOTIDINE 20 MG TABLET: Performed by: SURGERY

## 2022-11-02 PROCEDURE — G0378 HOSPITAL OBSERVATION PER HR: HCPCS

## 2022-11-02 PROCEDURE — 63710000001 ATENOLOL 50 MG TABLET: Performed by: SURGERY

## 2022-11-02 PROCEDURE — 85014 HEMATOCRIT: CPT | Performed by: SURGERY

## 2022-11-02 PROCEDURE — 63710000001 INSULIN ASPART PER 5 UNITS: Performed by: INTERNAL MEDICINE

## 2022-11-02 PROCEDURE — 85018 HEMOGLOBIN: CPT | Performed by: SURGERY

## 2022-11-02 PROCEDURE — 63710000001 POTASSIUM CHLORIDE 20 MEQ TABLET CONTROLLED-RELEASE: Performed by: SURGERY

## 2022-11-02 PROCEDURE — 82962 GLUCOSE BLOOD TEST: CPT

## 2022-11-02 PROCEDURE — 63710000001 GABAPENTIN 100 MG CAPSULE: Performed by: SURGERY

## 2022-11-02 PROCEDURE — 63710000001 TRIAMTERENE-HYDROCHLOROTHIAZIDE 37.5-25 MG TABLET: Performed by: SURGERY

## 2022-11-02 RX ADMIN — GABAPENTIN 100 MG: 100 CAPSULE ORAL at 06:21

## 2022-11-02 RX ADMIN — INSULIN ASPART 2 UNITS: 100 INJECTION, SOLUTION INTRAVENOUS; SUBCUTANEOUS at 07:30

## 2022-11-02 RX ADMIN — POTASSIUM CHLORIDE 20 MEQ: 1500 TABLET, EXTENDED RELEASE ORAL at 08:41

## 2022-11-02 RX ADMIN — TRIAMTERENE AND HYDROCHLOROTHIAZIDE 1 TABLET: 37.5; 25 TABLET ORAL at 08:41

## 2022-11-02 RX ADMIN — FAMOTIDINE 40 MG: 20 TABLET ORAL at 08:41

## 2022-11-02 RX ADMIN — ATENOLOL 50 MG: 50 TABLET ORAL at 08:41

## 2022-11-02 NOTE — PROGRESS NOTES
You're welcome.  They just uploaded the pictures.  I am so happy she did not have cancer.  She's a sweet lady.

## 2022-11-02 NOTE — PROGRESS NOTES
Patient: Anita Neves    @A@    Anesthesia Type: MAC  Patient location: [facility] Surgical Floor  Last vitals  BP      Temp      Pulse     Resp      SpO2        Post vital signs: stable  Level of consciousness: awake, alert and oriented    Post-anesthesia pain: adequate analgesia  Airway patency: patent  Respiratory: unassisted  Cardiovascular: stable and blood pressure at baseline  Hydration: euvolemic    Difficult Airway: no  Anesthetic complications: no

## 2022-11-02 NOTE — CASE MANAGEMENT/SOCIAL WORK
Case Management Discharge Note      Final Note: dc home         Selected Continued Care - Discharged on 11/2/2022 Admission date: 11/1/2022 - Discharge disposition: Home or Self Care    Destination    No services have been selected for the patient.              Durable Medical Equipment    No services have been selected for the patient.              Dialysis/Infusion    No services have been selected for the patient.              Home Medical Care    No services have been selected for the patient.              Therapy    No services have been selected for the patient.              Community Resources    No services have been selected for the patient.              Community & DME    No services have been selected for the patient.                       Final Discharge Disposition Code: 01 - home or self-care

## 2022-11-02 NOTE — PROGRESS NOTES
You are going to be seeing this sweet lady on Friday.  She has a big rectal polyp that I could not remove completely and then had to take her to surgery to control her bleeding.  I'm actually surprised the pathology did not show cancer.  Feel free to call or text with any questions.

## 2022-11-02 NOTE — PAYOR COMM NOTE
"Anita Pressley (72 y.o. Female)     ATTN: NURSE REVIEWER  RE: OBSERVATION ADMIT - PRECERT REQUEST FOR PROCEDURES  REF#[id number]  PLS REPLY TO PAULO MADRID [phone] FAX# [phone]      Date of Birth   1950    Social Security Number       Address   [address] [place] [address]    Home Phone   [phone]    MRN   [id number]       Worship   None    Marital Status   Single                            Admission Date   11/1/22    Admission Type   Elective    Admitting Provider   Angy Potts DO    Attending Provider       Department, Room/Bed   [facility] MED SURG, 1418/1       Discharge Date   11/2/2022    Discharge Disposition   Home or Self Care    Discharge Destination                               Attending Provider: (none)   Allergies: No Known Allergies    Isolation: None   Infection: None   Code Status: Not on file    Ht: 157.5 cm (62\")   Wt: 68.7 kg (151 lb 6.4 oz)    Admission Cmt: None   Principal Problem: Rectal bleed [K62.5]                 Active Insurance as of 11/1/2022     Primary Coverage     Payor Plan Insurance Group Employer/Plan Group    [facility] MEDICARE REPLACEMENT [employer] MEDICARE REPLACEMENT      Payor Plan Address Payor Plan Phone Number Payor Plan Fax Number Effective Dates    [address] [address] [phone]  8/1/2021 - None Entered    [facility] [address]       Subscriber Name Subscriber Birth Date Member ID       ANITA PRESSLEY 1950 [id number]           Secondary Coverage     Payor Plan Insurance Group Employer/Plan Group    [facility] BY LEAH [facility] BY LEAH [id number]     Payor Plan Address Payor Plan Phone Number Payor Plan Fax Number Effective Dates    [address] [address]   1/1/2021 - None Entered    [facility] [address]       Subscriber Name Subscriber Birth Date Member ID       ANITA PRESSLEY 1950 [id number]                 Emergency Contacts      (Rel.) Home Phone Work Phone " Mobile Phone    maren dempsey (Daughter) [phone] -- --               History & Physical      Angy Potts DO at 11/01/22 0853          Anita Neves 72 y.o. female presents @ the req of Dr. Hung for fecal incontinence.  Pt states she leaks stool, mucus, or blood all the time. Pt does not remember date of last c-scope. Pt also c/o bloating.           HPI   Above-noted agree.  This very pleasant 72-year-old female is here with some bowel issues.  She said about 1-1/2 years ago she developed issues with leakage.  She has to wear a pad because she leaks mucus all day.  She does move her bowels at least 2-3 times per day.  She occasionally has diarrhea as well.  She also has some bloating.  She tolerates a regular diet without nausea or vomiting.  She does not smoke or use tobacco products.  She had a colonoscopy greater than 10 years ago and she believes a polyp may have been removed.  She has no abdominal pain.  She has no other complaints.      Review of Systems   All other systems reviewed and are negative.            Current Outpatient Medications:   •  alendronate (FOSAMAX) 70 MG tablet, TAKE ONE TABLET BY MOUTH with 8oz of water on an empty stomach every Sunday, Disp: , Rfl:   •  ascorbic acid (QC Vitamin C) 1000 MG tablet, Take 1 tablet by mouth Daily., Disp: , Rfl:   •  aspirin 81 MG EC tablet, Take 1 tablet by mouth Daily., Disp: , Rfl:   •  atorvastatin (LIPITOR) 80 MG tablet, Take 80 mg by mouth Daily., Disp: , Rfl:   •  baclofen (LIORESAL) 10 MG tablet, TAKE ONE TABLET BY MOUTH THREE TIMES DAILY AS NEEDED FOR spasm, Disp: , Rfl:   •  ezetimibe (ZETIA) 10 MG tablet, Take 10 mg by mouth Daily., Disp: , Rfl:   •  famotidine (PEPCID) 20 MG tablet, Take 40 mg by mouth Daily., Disp: , Rfl:   •  gabapentin (NEURONTIN) 100 MG capsule, TAKE ONE CAPSULE BY MOUTH THREE TIMES DAILY FOR 30 DAYS, Disp: , Rfl:   •  Jardiance 10 MG tablet tablet, Take 10 mg by mouth Daily., Disp: , Rfl:   •  magnesium  gluconate (MAGONATE) 500 MG tablet, Take 1 tablet by mouth 2 (Two) Times a Day., Disp: , Rfl:   •  METOPROLOL SUCCINATE PO, Take  by mouth., Disp: , Rfl:   •  montelukast (SINGULAIR) 10 MG tablet, Take 10 mg by mouth Daily., Disp: , Rfl:   •  Multiple Vitamins-Minerals (MULTI COMPLETE PO), Take  by mouth., Disp: , Rfl:   •  Omega-3 Fatty Acids (OMEGA 3 500 PO), Take  by mouth., Disp: , Rfl:   •  Onglyza 5 MG tablet, Take 5 mg by mouth Daily., Disp: , Rfl:   •  pioglitazone (ACTOS) 30 MG tablet, Take 1 tablet by mouth Daily., Disp: , Rfl:   •  potassium chloride (K-DUR,KLOR-CON) 20 MEQ CR tablet, Take 20 mEq by mouth Daily., Disp: , Rfl:   •  triamterene-hydrochlorothiazide (MAXZIDE-25) 37.5-25 MG per tablet, Take 1 tablet by mouth Daily., Disp: , Rfl:   •  TURMERIC PO, Take  by mouth., Disp: , Rfl:   •  Vitamin E ([employer] Vitamin E) 180 MG (400 UNIT) capsule capsule, Take  by mouth., Disp: , Rfl:         No Known Allergies        Past Medical History:   Diagnosis Date   • Diabetes (HCC)    • HLD (hyperlipidemia)    • Seasonal allergies            Past Surgical History:   Procedure Laterality Date   • BACK SURGERY     • BLADDER SUSPENSION     • COLONOSCOPY     • WRIST SURGERY             Social History     Tobacco Use   • Smoking status: Former     Types: Cigarettes   • Smokeless tobacco: Never   Vaping Use   • Vaping Use: Never used   Substance Use Topics   • Alcohol use: Never   • Drug use: Defer           Immunization History   Administered Date(s) Administered   • COVID-19 ("[employer]") 03/17/2021   • COVID-19 (PFIZER) BIVALENT BOOSTER 12+YRS 09/22/2022           Physical Exam  Vitals and nursing note reviewed.   Constitutional:       Appearance: Normal appearance.   HENT:      Head: Normocephalic and atraumatic.   Cardiovascular:      Rate and Rhythm: Normal rate and regular rhythm.   Pulmonary:      Effort: Pulmonary effort is normal.      Breath sounds: Normal breath sounds.   Abdominal:      General: Bowel sounds are  "normal.      Palpations: Abdomen is soft.   Musculoskeletal:         General: No swelling or tenderness.   Skin:     General: Skin is warm and dry.   Neurological:      General: No focal deficit present.      Mental Status: She is alert and oriented to person, place, and time.   Psychiatric:         Mood and Affect: Mood normal.         Behavior: Behavior normal.         Debilities/Disabilities Identified: None    Emotional Behavior: Appropriate      /72   Pulse 72   Resp 20   Ht 157.5 cm (62\")   Wt 66.2 kg (146 lb)   BMI 26.70 kg/m²         Diagnoses and all orders for this visit:    1. Incontinence of feces, unspecified fecal incontinence type (Primary)    2. Hematochezia    3. Diarrhea, unspecified type    We will get Anita scheduled for a colonoscopy.  I discussed with the patient the benefits and risks of performing endoscopy.  Benefits and risks were not limited to but including bleeding, infection, perforation, complications of anesthesia, aspiration.  The patient appeared to understand and is willing to proceed.    Thank you for allowing me to participate in the care of this interesting patient.              Electronically signed by Angy Potts DO at 11/01/22 0845       Operative/Procedure Notes (last 24 hours)  Notes from 11/01/22 1638 through 11/02/22 1638   No notes of this type exist for this encounter.            Physician Progress Notes (last 24 hours)      Angy Potts DO at 11/02/22 1140        3 year repeat   Electronically signed by Angy Potts DO at 11/02/22 1341     Angy Potts DO at 11/02/22 1140        You're welcome.  They just uploaded the pictures.  I am so happy she did not have cancer.  She's a sweet lady.  Electronically signed by Angy Potts DO at 11/02/22 1341     Stephanie Wilson MD at 11/02/22 1140        Thanks for sending it my way    Electronically signed by Stephanie Wilson MD at 11/02/22 1340     Angy Potts DO at " 11/02/22 1140        You are going to be seeing this sweet lady on Friday.  She has a big rectal polyp that I could not remove completely and then had to take her to surgery to control her bleeding.  I'm actually surprised the pathology did not show cancer.  Feel free to call or text with any questions.  Electronically signed by Angy Potts DO at 11/02/22 1248     Flavio Peguero DO at 11/02/22 0729        Chart reviewed by me today.  Patient's vital signs stable.  Patient's blood glucose currently at goal.  Anticipate transition back to home oral medications at discharge.  Nursing notes reviewed from overnight and appears rectal bleeding has slowed.  Patient anticipating discharge home today, his position as per surgery.  No changes to current medicine plan.     Electronically signed by Flavio Peguero DO at 11/02/22 0730          Consult Notes (last 24 hours)      Flavio Peguero DO at 11/01/22 1724      Consult Orders    1. Inpatient Hospitalist Consult [[id number]] ordered by Angy Potts DO at 11/01/22 1717               [facility] HOSPITALIST CONSULT NOTE    Padmini King APRN    CONSULT REASON:  Medical management    HISTORY OF PRESENT ILLNESS:    Patient is a 72-year-old female with past medical history significant for diabetes mellitus type 2 non-insulin-requiring, obesity, hyperlipidemia, hypertension.  She presented to [facility] ED under care of Dr. Potts for colonoscopy procedure today.  Patient developed post colonoscopy rectal bleed and therefore was admitted to the hospital for observation.  Patient reports a history of diabetes, hypertension hyperlipidemia as per above.  She takes only oral hypoglycemic agents at home.  We discussed need for insulin while inpatient.  Patient denies abdominal pain, nausea, vomiting, diarrhea.       Past Medical History:   Diagnosis Date   • Diabetes mellitus (HCC)    • Hyperlipidemia    • Hypertension     • Insomnia    • Irritable bowel disease    • Kidney stone      Past Surgical History:   Procedure Laterality Date   • ENDOSCOPY     • ENDOSCOPY N/A 12/31/2019    Procedure: ESOPHAGOGASTRODUODENOSCOPY WITH BIOPSY;  Surgeon: Deniz Zeng Jr., MD;  Location: [facility] ENDOSCOPY;  Service: General   • GASTRIC BYPASS     • LASIK     • WRIST FRACTURE SURGERY Left      Family History   Problem Relation Age of Onset   • Diabetes Mother    • Sleep apnea Mother    • Obesity Father    • Hypertension Father    • Heart attack Father    • Obesity Brother    • Hypertension Brother      Social History     Tobacco Use   • Smoking status: Never Smoker   • Smokeless tobacco: Never Used   Vaping Use   • Vaping Use: Never used   Substance Use Topics   • Alcohol use: Yes     Comment: once every 3 months   • Drug use: No     Medications Prior to Admission   Medication Sig Dispense Refill Last Dose   • colestipol (COLESTID) 1 g tablet Take 1 tablet by mouth 2 (Two) Times a Day. 60 tablet 6 Past Week at Unknown time   • desonide (DESOWEN) 0.05 % cream APPLY 1 CREAM TOPICALLY TWICE DAILY   4/21/2022 at Unknown time   • melatonin 5 MG tablet tablet Take 5 mg by mouth Every Night.   4/21/2022 at Unknown time   • multivitamin with minerals tablet tablet Take 1 tablet by mouth Daily.   4/21/2022 at Unknown time   • propranolol LA (Inderal LA) 60 MG 24 hr capsule Take 1 capsule by mouth Every Night. 30 capsule 0 4/21/2022 at Unknown time   • SITagliptin (Januvia) 100 MG tablet Take 1 tablet by mouth Daily. For diabetes 30 tablet 0 4/21/2022 at Unknown time   • venlafaxine 225 MG tablet sustained-release 24 hour 24 hr tablet Take 1 tablet by mouth Daily. 90 each 1 4/21/2022 at Unknown time   • zolpidem (AMBIEN) 10 MG tablet Take 1 tablet by mouth At Night As Needed for Sleep. 90 tablet 0 4/21/2022 at Unknown time   • dicyclomine (BENTYL) 20 MG tablet Take 1 tablet by mouth 3 (Three) Times a Day. 90 tablet 3 4/20/2022      Allergies:  Glucophage [metformin] and Trazodone    Immunization History   Administered Date(s) Administered   • COVID-19 (MODERNA) 1st, 2nd, 3rd Dose Only 01/05/2021, 02/02/2021   • Flu Vaccine Intradermal Quad 18-64YR 10/13/2017   • Flu Vaccine Quad PF >36MO 10/19/2016, 10/15/2019, 11/12/2020   • FluLaval/Fluarix/Fluzone >6 10/15/2019   • Hepatitis A 05/11/2018   • Pneumococcal Polysaccharide (PPSV23) 10/19/2016   • Tdap 10/19/2016       REVIEW OF SYSTEMS:  Please see the above history of present illness for pertinent positives and negatives.  The remainder of the patient's systems have been reviewed and are negative.     Vital Signs  Temp:  [98.2 °F (36.8 °C)] 98.2 °F (36.8 °C)  Heart Rate:  [76] 76  Resp:  [20] 20  BP: (118)/(87) 118/87  Flowsheet Rows      Flowsheet Row First Filed Value   Admission Height --   Admission Weight 146 kg (321 lb 12.8 oz) Documented at 04/22/2022 0740             Physical Exam:  General: Patient is awake and alert.  Obese elderly female. No acute distress noted.   HENT: Head is atraumatic, normocephalic. Hearing is grossly intact. Nose is without obvious congestion and appears patent. Neck is supple and trachea is midline.   Eyes: Vision is grossly intact. Pupils appear equal and round.   Cardiovascular: Heart has regular rate and rhythm with no murmurs, rubs or gallops noted.   Respiratory: Lungs are clear to ausculation without wheezes, rhonchi or rales.   Abdominal/GI: Soft, nontender, bowel sounds present. No rebound or guarding present.   Extremities: No peripheral edema noted.   Musculoskeletal: Spontaneous movement of bilateral upper and lower extremities against gravity noted. No signs of injury or deformity noted.   Skin: Warm and dry.   Psych: Mood and affect are appropriate. Cooperative with exam.   Neuro: No facial asymmetry noted. No focal deficits noted, hearing and vision are grossly intact.       Results Review:    I reviewed the patient's new clinical  "results.  Lab Results (most recent)       Procedure Component Value Units Date/Time    POC Glucose Once [[id number]]  (Abnormal) Collected: 04/22/22 0809    Specimen: Blood Updated: 04/22/22 0816     Glucose 183 mg/dL      Comment: Meter: [id number] : [id number] Alondra Huertas RN (PRN)               Imaging Results (Most Recent)       None          None    ECG/EMG Results (most recent)       None              Assessment/Plan   DM2 in obese female with diabetic peripheral neuropathy:  Hold home regimen   Add accuchecks every 6 hours, low dose SSI  -Continue home gabapentin  -Check A1c.     HTN: Continue home Maxide metoprolol succinate    Osteoporosis-continue Fosamax on discharge    Hyperlipidemia-continue atorvastatin    GERD-continue Pepcid    Seasonal allergic rhinitis-hold home Singulair    Post colonoscopy rectal bleed-management as per general surgery    I discussed the patient's findings and my recommendations with patient..     Flavio Peguero DO  10/17/2022   17:24 EDT     At [facility], we believe that sharing information builds trust and better relationships. You are receiving this note because you recently visited [facility]. It is possible you will see health information before a provider has talked with you about it. This kind of information can be easy to misunderstand. To help you fully understand what it means for your health, we urge you to discuss this note with your provider.     \"Dictated utilizing Dragon dictation\"          Electronically signed by Flavio Peguero DO at 11/01/22 1755       "

## 2022-11-02 NOTE — PLAN OF CARE
Goal Outcome Evaluation:  Plan of Care Reviewed With: patient        Progress: improving  Outcome Evaluation: VSS, rested well, bleeding slowed through the night, up with stand by assist, anticipates going home today

## 2022-11-02 NOTE — DISCHARGE SUMMARY
Discharge Summary    Patient name: Anita Neves    Medical record number: [id number]    Admission date: 11/1/2022  Discharge date:  11/2/2022    Attending physician: Dr. LINCOLN Potts    Primary care physician: Dennis Hung MD    Referring physician: Angy Potts DO  [address] [place] [address]    Consulting physician(s):  Internal Medicine     Condition on discharge: stable    Primary Diagnoses:  Rectal bleed    Secondary Diagnoses:     Operative Procedure: Colonoscopy with polypectomy, Rectal examination under anesthesia with control of hemostasis     Hospital Course: The patient is a very pleasant 72 y.o. female that was admitted to the hospital after having an emergency surgery for a rectal bleed.  She has a rectal mass that was biopsied during a colonoscopy and it bled which required going to the operating room to control the bleeding.  She had no further episodes of bleeding but her glucose was over 300 so internal medicine was consulted.  Her hemoglobin and glucose were stable on discharge.  Her pathology is pending.    Discharge medications:      Discharge Medications      Continue These Medications      Instructions Start Date   alendronate 70 MG tablet  Commonly known as: FOSAMAX   TAKE ONE TABLET BY MOUTH with 8oz of water on an empty stomach every Sunday      ascorbic acid 1000 MG tablet  Commonly known as: VITAMIN C   1,000 mg, Oral, Daily      aspirin 81 MG EC tablet   81 mg, Oral, Daily      atorvastatin 80 MG tablet  Commonly known as: LIPITOR   80 mg, Oral, Daily      baclofen 10 MG tablet  Commonly known as: LIORESAL   1 tablet 2 (Two) Times a Day.      ezetimibe 10 MG tablet  Commonly known as: ZETIA   10 mg, Oral, Daily      famotidine 20 MG tablet  Commonly known as: PEPCID   40 mg, Oral, Daily      gabapentin 100 MG capsule  Commonly known as: NEURONTIN   1 capsule 2 (Two) Times a Day.      Jardiance 10 MG tablet tablet  Generic drug: empagliflozin   10  mg, Oral, Daily      magnesium gluconate 500 MG tablet  Commonly known as: MAGONATE   27 mg, Oral, 2 Times Daily      METOPROLOL SUCCINATE PO   50 mg, Oral, Daily      montelukast 10 MG tablet  Commonly known as: SINGULAIR   10 mg, Oral, Daily      MULTI COMPLETE PO   Oral, Daily      OMEGA 3 500 PO   Oral, 2 Times Daily      Onglyza 5 MG tablet  Generic drug: SAXagliptin   5 mg, Oral, Daily      pioglitazone 30 MG tablet  Commonly known as: ACTOS   30 mg, Oral, Daily      potassium chloride 20 MEQ CR tablet  Commonly known as: K-DUR,KLOR-CON   20 mEq, Oral, Daily      triamterene-hydrochlorothiazide 37.5-25 MG per tablet  Commonly known as: MAXZIDE-25   1 tablet, Oral, Daily      TURMERIC PO   Oral, 2 Times Daily      Vitamin E 180 MG (400 UNIT) capsule capsule   2 capsules, Oral, 2 Times Daily             Discharge instructions:    Take one tablespoon of Metamucil daily    Follow-up appointment: Follow up with Dr. Wilson in the office on Friday. Call for questions at [phone].

## 2022-11-02 NOTE — PROGRESS NOTES
Chart reviewed by me today.  Patient's vital signs stable.  Patient's blood glucose currently at goal.  Anticipate transition back to home oral medications at discharge.  Nursing notes reviewed from overnight and appears rectal bleeding has slowed.  Patient anticipating discharge home today, his position as per surgery.  No changes to current medicine plan.

## 2022-11-03 ENCOUNTER — READMISSION MANAGEMENT (OUTPATIENT)
Dept: CALL CENTER | Facility: HOSPITAL | Age: 72
End: 2022-11-03

## 2022-11-03 NOTE — OUTREACH NOTE
Prep Survey    Flowsheet Row Responses   [facility] facility patient discharged from? [place]   Is LACE score < 7 ? No   Emergency Room discharge w/ pulse ox? No   Eligibility Readm Mgmt   Discharge diagnosis Rectal bleed   Does the patient have one of the following disease processes/diagnoses(primary or secondary)? Other   Does the patient have Home health ordered? No   Is there a DME ordered? No   Prep survey completed? Yes          NKECHI YUEN - Registered Nurse

## 2022-11-04 ENCOUNTER — OFFICE VISIT (OUTPATIENT)
Dept: SURGERY | Facility: CLINIC | Age: 72
End: 2022-11-04

## 2022-11-04 ENCOUNTER — READMISSION MANAGEMENT (OUTPATIENT)
Dept: CALL CENTER | Facility: HOSPITAL | Age: 72
End: 2022-11-04

## 2022-11-04 VITALS
WEIGHT: 151.2 LBS | HEIGHT: 62 IN | BODY MASS INDEX: 27.82 KG/M2 | SYSTOLIC BLOOD PRESSURE: 110 MMHG | HEART RATE: 80 BPM | DIASTOLIC BLOOD PRESSURE: 64 MMHG | TEMPERATURE: 97.6 F | OXYGEN SATURATION: 99 %

## 2022-11-04 DIAGNOSIS — K62.1 RECTAL POLYP: Primary | ICD-10-CM

## 2022-11-04 PROBLEM — I10 ESSENTIAL HYPERTENSION: Status: ACTIVE | Noted: 2022-11-04

## 2022-11-04 PROBLEM — E11.9 TYPE 2 DIABETES MELLITUS WITHOUT COMPLICATION: Status: ACTIVE | Noted: 2022-11-04

## 2022-11-04 PROBLEM — M81.0 AGE RELATED OSTEOPOROSIS: Status: ACTIVE | Noted: 2022-11-04

## 2022-11-04 PROBLEM — E11.42 DIABETIC PERIPHERAL NEUROPATHY ASSOCIATED WITH TYPE 2 DIABETES MELLITUS: Status: ACTIVE | Noted: 2022-11-04

## 2022-11-04 PROBLEM — M17.9 OSTEOARTHRITIS OF KNEE: Status: ACTIVE | Noted: 2022-11-04

## 2022-11-04 PROBLEM — H90.3 BILATERAL SENSORINEURAL HEARING LOSS: Status: ACTIVE | Noted: 2022-11-04

## 2022-11-04 PROBLEM — K21.9 GASTRO-ESOPHAGEAL REFLUX DISEASE WITHOUT ESOPHAGITIS: Status: ACTIVE | Noted: 2022-11-04

## 2022-11-04 PROBLEM — R91.1 SOLITARY PULMONARY NODULE: Status: ACTIVE | Noted: 2022-11-04

## 2022-11-04 PROBLEM — J30.9 ALLERGIC RHINITIS: Status: ACTIVE | Noted: 2022-11-04

## 2022-11-04 PROBLEM — H90.0 CONDUCTIVE HEARING LOSS, BILATERAL: Status: ACTIVE | Noted: 2022-11-04

## 2022-11-04 PROBLEM — G57.60 PLANTAR NERVE LESION: Status: ACTIVE | Noted: 2022-11-04

## 2022-11-04 PROBLEM — E55.9 VITAMIN D DEFICIENCY: Status: ACTIVE | Noted: 2022-11-04

## 2022-11-04 PROBLEM — E78.2 MIXED HYPERLIPIDEMIA: Status: ACTIVE | Noted: 2022-11-04

## 2022-11-04 PROCEDURE — 99204 OFFICE O/P NEW MOD 45 MIN: CPT | Performed by: COLON & RECTAL SURGERY

## 2022-11-04 RX ORDER — CEFAZOLIN SODIUM 2 G/100ML
2 INJECTION, SOLUTION INTRAVENOUS ONCE
Status: CANCELLED | OUTPATIENT
Start: 2022-12-01 | End: 2022-11-04

## 2022-11-04 RX ORDER — METRONIDAZOLE 500 MG/1
500 TABLET ORAL 2 TIMES DAILY
COMMUNITY
Start: 2022-08-19 | End: 2022-11-04

## 2022-11-04 RX ORDER — SODIUM CHLORIDE 0.9 % (FLUSH) 0.9 %
10 SYRINGE (ML) INJECTION EVERY 12 HOURS SCHEDULED
Status: CANCELLED | OUTPATIENT
Start: 2022-12-01

## 2022-11-04 RX ORDER — SODIUM CHLORIDE 0.9 % (FLUSH) 0.9 %
10 SYRINGE (ML) INJECTION AS NEEDED
Status: CANCELLED | OUTPATIENT
Start: 2022-12-01

## 2022-11-04 RX ORDER — NIRMATRELVIR AND RITONAVIR 300-100 MG
3 KIT ORAL 2 TIMES DAILY
COMMUNITY
Start: 2022-10-02 | End: 2022-11-04

## 2022-11-04 RX ORDER — AMOXICILLIN 500 MG/1
CAPSULE ORAL
COMMUNITY
Start: 2022-08-19 | End: 2022-11-04

## 2022-11-04 RX ORDER — CLARITHROMYCIN 500 MG/1
500 TABLET, COATED ORAL EVERY 12 HOURS
COMMUNITY
Start: 2022-08-19 | End: 2022-11-04

## 2022-11-04 RX ORDER — AZITHROMYCIN 250 MG/1
TABLET, FILM COATED ORAL
COMMUNITY
Start: 2022-10-04 | End: 2022-11-04

## 2022-11-04 RX ORDER — METRONIDAZOLE 500 MG/100ML
500 INJECTION, SOLUTION INTRAVENOUS ONCE
Status: CANCELLED | OUTPATIENT
Start: 2022-12-01 | End: 2022-11-04

## 2022-11-04 RX ORDER — PREDNISONE 10 MG/1
TABLET ORAL
COMMUNITY
Start: 2022-10-04 | End: 2022-11-04

## 2022-11-04 NOTE — PROGRESS NOTES
Anita Neves is a 72 y.o. female who is seen as a consult at the request of Angy Potts DO for Rectal Bleeding (Patient here today with Both daughter's Nicole Wu & Sandy Espino, & Granddaughter Laya Humphries.).      HPI:  The patient states she had some bleeding this morning when she had a bowel movement, however; it was not much. She states that before she had her colonoscopy, she would have fresh blood every once in a while, when she would wipe after a bowel movement. The patient denies any pain. The adult female states that the patient has been having some leakage. The patient's daughter reports that she has been leaking for years. The patient states that she leaks poop and blood, it is slimy in consistency. She reports that she is using a panty liner or a pad.     Past Medical History:   Diagnosis Date   • Diabetes (HCC)    • HLD (hyperlipidemia)    • Seasonal allergies    • Tachycardia determined by examination of pulse     on metoprolol       Past Surgical History:   Procedure Laterality Date   • BACK SURGERY     • BLADDER SUSPENSION     • COLONOSCOPY     • COLONOSCOPY W/ POLYPECTOMY N/A 11/1/2022    Procedure: COLONOSCOPY with polypectomy;  Surgeon: Angy Potts DO;  Location: [facility] OR;  Service: Gastroenterology;  Laterality: N/A;  DIVERTICULOSIS  CECAL POLYP  RIGHT COLON POLYPS X2  CECAL TIME AT 1002  RECTAL POLYPS X2- HOT SNARE   • RECTAL EXAMINATION UNDER ANESTHESIA N/A 11/1/2022    Procedure: RECTAL EXAM UNDER ANESTHESIA, CONTROL OF BLEEDING;  Surgeon: Angy Potts DO;  Location: [facility];  Service: General;  Laterality: N/A;   • WRIST SURGERY         Social History:   reports that she has quit smoking. Her smoking use included cigarettes. She has been exposed to tobacco smoke. She has never used smokeless tobacco. Drug use questions deferred to the physician. She reports that she does not drink alcohol.      Marriage status: Single    Family History   Problem  Relation Age of Onset   • Diabetes Mother    • Diabetes Sister          Current Outpatient Medications:   •  alendronate (FOSAMAX) 70 MG tablet, TAKE ONE TABLET BY MOUTH with 8oz of water on an empty stomach every Sunday, Disp: , Rfl:   •  ascorbic acid (VITAMIN C) 1000 MG tablet, Take 1 tablet by mouth Daily., Disp: , Rfl:   •  aspirin 81 MG EC tablet, Take 1 tablet by mouth Daily., Disp: , Rfl:   •  atorvastatin (LIPITOR) 80 MG tablet, Take 80 mg by mouth Daily., Disp: , Rfl:   •  baclofen (LIORESAL) 10 MG tablet, 1 tablet 2 (Two) Times a Day., Disp: , Rfl:   •  ezetimibe (ZETIA) 10 MG tablet, Take 10 mg by mouth Daily., Disp: , Rfl:   •  famotidine (PEPCID) 20 MG tablet, Take 40 mg by mouth Daily., Disp: , Rfl:   •  gabapentin (NEURONTIN) 100 MG capsule, 1 capsule 2 (Two) Times a Day., Disp: , Rfl:   •  Jardiance 10 MG tablet tablet, Take 10 mg by mouth Daily., Disp: , Rfl:   •  magnesium gluconate (MAGONATE) 500 MG tablet, Take 1 tablet by mouth 2 (Two) Times a Day., Disp: , Rfl:   •  METOPROLOL SUCCINATE PO, Take 50 mg by mouth Daily., Disp: , Rfl:   •  montelukast (SINGULAIR) 10 MG tablet, Take 10 mg by mouth Daily., Disp: , Rfl:   •  Multiple Vitamins-Minerals (MULTI COMPLETE PO), Take  by mouth Daily., Disp: , Rfl:   •  Omega-3 Fatty Acids (OMEGA 3 500 PO), Take  by mouth 2 (Two) Times a Day., Disp: , Rfl:   •  Onglyza 5 MG tablet, Take 5 mg by mouth Daily., Disp: , Rfl:   •  pioglitazone (ACTOS) 30 MG tablet, Take 1 tablet by mouth Daily., Disp: , Rfl:   •  Pioglitazone HCl-metFORMIN HCl (PIOGLITAZONE HCL-METFORMIN ER PO), , Disp: , Rfl:   •  potassium chloride (K-DUR,KLOR-CON) 20 MEQ CR tablet, Take 20 mEq by mouth Daily., Disp: , Rfl:   •  triamterene-hydrochlorothiazide (MAXZIDE-25) 37.5-25 MG per tablet, Take 1 tablet by mouth Daily., Disp: , Rfl:   •  TURMERIC PO, Take  by mouth 2 (Two) Times a Day., Disp: , Rfl:   •  Vitamin E 180 MG (400 UNIT) capsule capsule, Take 2 capsules by mouth 2 (Two) Times a  Day., Disp: , Rfl:   No current facility-administered medications for this visit.    Allergy  Patient has no known allergies.    Review of Systems   Constitutional: Positive for decreased appetite. Negative for weight gain.   HENT: Negative for congestion, hearing loss and hoarse voice.    Eyes: Negative for blurred vision, discharge and visual disturbance.   Cardiovascular: Negative for chest pain, cyanosis and leg swelling.   Respiratory: Negative for cough, shortness of breath, sleep disturbances due to breathing and snoring.    Endocrine: Negative for cold intolerance and heat intolerance.   Hematologic/Lymphatic: Does not bruise/bleed easily.   Skin: Negative for itching, poor wound healing and skin cancer.   Musculoskeletal: Negative for arthritis, back pain, joint pain and joint swelling.   Gastrointestinal: Positive for hematochezia. Negative for abdominal pain, change in bowel habit, bowel incontinence and constipation.   Genitourinary: Negative for bladder incontinence, dysuria and hematuria.   Neurological: Negative for brief paralysis, excessive daytime sleepiness, dizziness, focal weakness, headaches, light-headedness and weakness.   Psychiatric/Behavioral: Negative for altered mental status and hallucinations. The patient does not have insomnia.    Allergic/Immunologic: Negative for HIV exposure and persistent infections.   All other systems reviewed and are negative.      Vitals:    11/04/22 0937   BP: 110/64   Pulse: 80   Temp: 97.6 °F (36.4 °C)   SpO2: 99%     Body mass index is 27.65 kg/m².    Physical Exam  Exam conducted with a chaperone present.   Constitutional:       General: She is not in acute distress.     Appearance: She is well-developed.   HENT:      Head: Normocephalic and atraumatic.      Nose: Nose normal.   Eyes:      Conjunctiva/sclera: Conjunctivae normal.      Pupils: Pupils are equal, round, and reactive to light.   Neck:      Trachea: No tracheal deviation.   Pulmonary:       Effort: Pulmonary effort is normal. No respiratory distress.      Breath sounds: Normal breath sounds.   Abdominal:      General: Bowel sounds are normal. There is no distension.      Palpations: Abdomen is soft.   Musculoskeletal:         General: No deformity. Normal range of motion.      Cervical back: Normal range of motion.   Skin:     General: Skin is warm and dry.   Neurological:      Mental Status: She is alert and oriented to person, place, and time.      Cranial Nerves: No cranial nerve deficit.      Coordination: Coordination normal.      Gait: Gait normal.   Psychiatric:         Behavior: Behavior normal.         Judgment: Judgment normal.         Review of Medical Record:  I reviewed colonoscopy report and images and rectal polyp identified.  Path = tubular adenoma    Assessment:  1. Rectal polyp      Plan:  I discussed with the patient that the biopsy shows that it is a tubular adenoma. I explained that we just want to remove it so it does not bleed and it does not grow bigger. I explained that it is kind of at the end of the rectum. I explained that it does not have a very wide base to it, so it is going to be pretty easy to remove. I explained that we go in through the anus and take a margin around it. I explained that if we look like this, we go around it and take tissue on either side of it to make sure that we get a good margin and then we stitch it back together. I explained that it is called a transanal  excision and usually it takes about 45 minutes, maybe an hour and get to go home after. I explained that she does not have to do a big prep and do a few enemas before she goes. I explained that she is a little sore afterwards because obviously we are using the anus as the opening to get to it and so we are not going to stretch it out. I explained that it is just going to be some tugging and pulling because of the need to get stitches. I explained that she will need to take MiraLAX stool softener  the day 2 or 3 days before that start taking some MiraLAX stool softener to keep the bowels soft. I explained that the chance of infection is pretty low, less than 10%, but if it did happen, sometimes we have to go in and do either antibiotics or drain it. I explained that the chance that the sutures that are the places where we stitch it back up comes apart, it is usually not that big a deal even if it does, it will heal in, it may take a little bit longer. I explained that she may need to be on stool softeners for a little bit longer, but it does not usually impact her life very much.      Transcribed from ambient dictation for Stephanie Wilson MD by Kiana March.  11/04/22   11:37 EDT    Patient or patient representative verbalized consent to the visit recording.   This patient was evaluated by me, recommendations made, documentation reviewed, edited, and revised by me, Stephanie Wilson MD

## 2022-11-04 NOTE — OUTREACH NOTE
Medical Week 1 Survey    Flowsheet Row Responses   [facility] facility patient discharged from? [place]   Does the patient have one of the following disease processes/diagnoses(primary or secondary)? Other   Week 1 attempt successful? No   Unsuccessful attempts Attempt 1  [UTR all 3 contact numbers]          MINREVA YUEN - Registered Nurse

## 2022-11-08 ENCOUNTER — READMISSION MANAGEMENT (OUTPATIENT)
Dept: CALL CENTER | Facility: HOSPITAL | Age: 72
End: 2022-11-08

## 2022-11-08 NOTE — OUTREACH NOTE
Medical Week 1 Survey    Flowsheet Row Responses   [facility] [place] patient discharged from? [place]   Does the patient have one of the following disease processes/diagnoses(primary or secondary)? Other   Week 1 attempt successful? Yes   Call start time 1611   Call end time 1614   Discharge diagnosis Rectal bleed   Meds reviewed with patient/caregiver? Yes   Is the patient having any side effects they believe may be caused by any medication additions or changes? No   Does the patient have all medications ordered at discharge? N/A   Is the patient taking all medications as directed (includes completed medication regime)? Yes   Does the patient have a primary care provider?  Yes   Does the patient have an appointment with their PCP within 7 days of discharge? Yes   Has the patient kept scheduled appointments due by today? N/A   Comments polyp procedure scheduled for 12/1/22   Has home health visited the patient within 72 hours of discharge? N/A   Psychosocial issues? No   Did the patient receive a copy of their discharge instructions? Yes   Nursing interventions Reviewed instructions with patient   What is the patient's perception of their health status since discharge? Improving   Is the patient/caregiver able to teach back signs and symptoms related to disease process for when to call PCP? Yes   Is the patient/caregiver able to teach back signs and symptoms related to disease process for when to call 911? Yes   Is the patient/caregiver able to teach back the hierarchy of who to call/visit for symptoms/problems? PCP, Specialist, Home health nurse, Urgent Care, ED, 911 Yes   If the patient is a current smoker, are they able to teach back resources for cessation? Not a smoker   Additional teach back comments states is free of rectal bleeding   Week 1 call completed? Yes          MINERVA YUEN - Registered Nurse

## 2022-11-10 ENCOUNTER — APPOINTMENT (OUTPATIENT)
Dept: CT IMAGING | Facility: HOSPITAL | Age: 72
End: 2022-11-10

## 2022-11-10 ENCOUNTER — HOSPITAL ENCOUNTER (EMERGENCY)
Facility: HOSPITAL | Age: 72
Discharge: HOME | End: 2022-11-10
Attending: EMERGENCY MEDICINE | Admitting: EMERGENCY MEDICINE

## 2022-11-10 VITALS
RESPIRATION RATE: 16 BRPM | WEIGHT: 165 LBS | SYSTOLIC BLOOD PRESSURE: 147 MMHG | BODY MASS INDEX: 30.36 KG/M2 | DIASTOLIC BLOOD PRESSURE: 89 MMHG | OXYGEN SATURATION: 96 % | HEART RATE: 101 BPM | TEMPERATURE: 97.8 F | HEIGHT: 62 IN

## 2022-11-10 DIAGNOSIS — K62.5 RECTAL BLEEDING: Primary | ICD-10-CM

## 2022-11-10 LAB
ABO GROUP BLD: NORMAL
ALBUMIN SERPL-MCNC: 4.2 G/DL (ref 3.5–5.2)
ALBUMIN/GLOB SERPL: 1.2 G/DL
ALP SERPL-CCNC: 135 U/L (ref 39–117)
ALT SERPL W P-5'-P-CCNC: 24 U/L (ref 1–33)
ANION GAP SERPL CALCULATED.3IONS-SCNC: 12 MMOL/L (ref 5–15)
AST SERPL-CCNC: 32 U/L (ref 1–32)
BASOPHILS # BLD AUTO: 0.04 10*3/MM3 (ref 0–0.2)
BASOPHILS NFR BLD AUTO: 0.4 % (ref 0–1.5)
BILIRUB SERPL-MCNC: 0.4 MG/DL (ref 0–1.2)
BLD GP AB SCN SERPL QL: NEGATIVE
BUN SERPL-MCNC: 24 MG/DL (ref 8–23)
BUN/CREAT SERPL: 22.6 (ref 7–25)
CALCIUM SPEC-SCNC: 9.2 MG/DL (ref 8.6–10.5)
CHLORIDE SERPL-SCNC: 97 MMOL/L (ref 98–107)
CO2 SERPL-SCNC: 27 MMOL/L (ref 22–29)
CREAT SERPL-MCNC: 1.06 MG/DL (ref 0.57–1)
D-LACTATE SERPL-SCNC: 1.7 MMOL/L (ref 0.5–2)
DEPRECATED RDW RBC AUTO: 49.6 FL (ref 37–54)
EGFRCR SERPLBLD CKD-EPI 2021: 55.2 ML/MIN/1.73
EOSINOPHIL # BLD AUTO: 0.03 10*3/MM3 (ref 0–0.4)
EOSINOPHIL NFR BLD AUTO: 0.3 % (ref 0.3–6.2)
ERYTHROCYTE [DISTWIDTH] IN BLOOD BY AUTOMATED COUNT: 15.2 % (ref 12.3–15.4)
GLOBULIN UR ELPH-MCNC: 3.4 GM/DL
GLUCOSE SERPL-MCNC: 178 MG/DL (ref 65–99)
HCT VFR BLD AUTO: 36.6 % (ref 34–46.6)
HGB BLD-MCNC: 12 G/DL (ref 12–15.9)
HOLD SPECIMEN: NORMAL
IMM GRANULOCYTES # BLD AUTO: 0.07 10*3/MM3 (ref 0–0.05)
IMM GRANULOCYTES NFR BLD AUTO: 0.7 % (ref 0–0.5)
INR PPP: 0.94 (ref 0.9–1.1)
LYMPHOCYTES # BLD AUTO: 2.45 10*3/MM3 (ref 0.7–3.1)
LYMPHOCYTES NFR BLD AUTO: 25.2 % (ref 19.6–45.3)
MCH RBC QN AUTO: 30.1 PG (ref 26.6–33)
MCHC RBC AUTO-ENTMCNC: 32.8 G/DL (ref 31.5–35.7)
MCV RBC AUTO: 91.7 FL (ref 79–97)
MONOCYTES # BLD AUTO: 0.91 10*3/MM3 (ref 0.1–0.9)
MONOCYTES NFR BLD AUTO: 9.4 % (ref 5–12)
NEUTROPHILS NFR BLD AUTO: 6.23 10*3/MM3 (ref 1.7–7)
NEUTROPHILS NFR BLD AUTO: 64 % (ref 42.7–76)
NRBC BLD AUTO-RTO: 0 /100 WBC (ref 0–0.2)
PLATELET # BLD AUTO: 278 10*3/MM3 (ref 140–450)
PMV BLD AUTO: 10.1 FL (ref 6–12)
POTASSIUM SERPL-SCNC: 3.3 MMOL/L (ref 3.5–5.2)
PROT SERPL-MCNC: 7.6 G/DL (ref 6–8.5)
PROTHROMBIN TIME: 12.6 SECONDS (ref 12.1–15)
RBC # BLD AUTO: 3.99 10*6/MM3 (ref 3.77–5.28)
RH BLD: NEGATIVE
SODIUM SERPL-SCNC: 136 MMOL/L (ref 136–145)
T&S EXPIRATION DATE: NORMAL
WBC NRBC COR # BLD: 9.73 10*3/MM3 (ref 3.4–10.8)
WHOLE BLOOD HOLD COAG: NORMAL
WHOLE BLOOD HOLD SPECIMEN: NORMAL

## 2022-11-10 PROCEDURE — 0 DIATRIZOATE MEGLUMINE & SODIUM PER 1 ML: Performed by: EMERGENCY MEDICINE

## 2022-11-10 PROCEDURE — 86900 BLOOD TYPING SEROLOGIC ABO: CPT | Performed by: EMERGENCY MEDICINE

## 2022-11-10 PROCEDURE — 83605 ASSAY OF LACTIC ACID: CPT | Performed by: EMERGENCY MEDICINE

## 2022-11-10 PROCEDURE — 86850 RBC ANTIBODY SCREEN: CPT | Performed by: EMERGENCY MEDICINE

## 2022-11-10 PROCEDURE — 0 IOPAMIDOL PER 1 ML: Performed by: EMERGENCY MEDICINE

## 2022-11-10 PROCEDURE — 36415 COLL VENOUS BLD VENIPUNCTURE: CPT

## 2022-11-10 PROCEDURE — 99284 EMERGENCY DEPT VISIT MOD MDM: CPT

## 2022-11-10 PROCEDURE — 85610 PROTHROMBIN TIME: CPT | Performed by: EMERGENCY MEDICINE

## 2022-11-10 PROCEDURE — 80053 COMPREHEN METABOLIC PANEL: CPT | Performed by: EMERGENCY MEDICINE

## 2022-11-10 PROCEDURE — 85025 COMPLETE CBC W/AUTO DIFF WBC: CPT | Performed by: EMERGENCY MEDICINE

## 2022-11-10 PROCEDURE — 74177 CT ABD & PELVIS W/CONTRAST: CPT

## 2022-11-10 PROCEDURE — 86901 BLOOD TYPING SEROLOGIC RH(D): CPT | Performed by: EMERGENCY MEDICINE

## 2022-11-10 RX ORDER — SODIUM CHLORIDE 0.9 % (FLUSH) 0.9 %
10 SYRINGE (ML) INJECTION AS NEEDED
Status: DISCONTINUED | OUTPATIENT
Start: 2022-11-10 | End: 2022-11-11 | Stop reason: HOSPADM

## 2022-11-10 RX ADMIN — DIATRIZOATE MEGLUMINE AND DIATRIZOATE SODIUM 30 ML: 600; 100 SOLUTION ORAL; RECTAL at 19:06

## 2022-11-10 RX ADMIN — IOPAMIDOL 100 ML: 755 INJECTION, SOLUTION INTRAVENOUS at 21:03

## 2022-11-10 NOTE — ED PROVIDER NOTES
Subjective   History of Present Illness  72-year-old female past medical history significant for non-insulin-dependent diabetes and tachycardia treated with metoprolol presents to the emergency room concern for rectal bleeding.  9 days ago patient had a colonoscopy by Dr. Potts reports found she had a polyp that per patient was partially removed however patient had postop bleeding requiring Dr. Bliss to go back to the endoscopy suite and recauterize.  Patient states that since that time she has been doing very well eating normally and had a normal bowel movement this morning however approximately 1 hour before presentation to the emergency room she had a sudden urge to go the bathroom and had large amount of blood pour out of her.  Since that time she has had mild amount of clotting and rectal bleeding noted.  EMS states there was a large amount of blood in the toilet and also some clots on the floor in the bathroom.  Patient states she has not taken any of her medications today because she forgot.        Review of Systems   Constitutional: Negative for activity change, appetite change, chills, diaphoresis, fatigue and fever.   HENT: Negative for congestion, rhinorrhea and sore throat.    Eyes: Negative for photophobia and visual disturbance.   Respiratory: Negative for cough and shortness of breath.    Cardiovascular: Negative for chest pain, palpitations and leg swelling.   Gastrointestinal: Positive for blood in stool. Negative for abdominal distention, abdominal pain, diarrhea, nausea and vomiting.   Genitourinary: Negative for dysuria and flank pain.   Musculoskeletal: Negative for arthralgias and back pain.   Skin: Negative for rash.   Neurological: Negative for dizziness, weakness and headaches.   Psychiatric/Behavioral: Negative for agitation and behavioral problems.       Past Medical History:   Diagnosis Date   • Diabetes (HCC)    • HLD (hyperlipidemia)    • Seasonal allergies    • Tachycardia  determined by examination of pulse     on metoprolol       No Known Allergies    Past Surgical History:   Procedure Laterality Date   • BACK SURGERY     • BLADDER SUSPENSION     • COLONOSCOPY     • COLONOSCOPY W/ POLYPECTOMY N/A 11/1/2022    Procedure: COLONOSCOPY with polypectomy;  Surgeon: Angy Potts DO;  Location: [facility];  Service: Gastroenterology;  Laterality: N/A;  DIVERTICULOSIS  CECAL POLYP  RIGHT COLON POLYPS X2  CECAL TIME AT 1002  RECTAL POLYPS X2- HOT SNARE   • RECTAL EXAMINATION UNDER ANESTHESIA N/A 11/1/2022    Procedure: RECTAL EXAM UNDER ANESTHESIA, CONTROL OF BLEEDING;  Surgeon: Angy Potts DO;  Location: [facility] OR;  Service: General;  Laterality: N/A;   • WRIST SURGERY         Family History   Problem Relation Age of Onset   • Diabetes Mother    • Diabetes Sister        Social History     Socioeconomic History   • Marital status: Single   Tobacco Use   • Smoking status: Former     Types: Cigarettes     Passive exposure: Past   • Smokeless tobacco: Never   Vaping Use   • Vaping Use: Never used   Substance and Sexual Activity   • Alcohol use: Never   • Drug use: Defer   • Sexual activity: Defer     Birth control/protection: Post-menopausal           Objective   Physical Exam  Constitutional:       General: She is not in acute distress.     Appearance: Normal appearance. She is not ill-appearing, toxic-appearing or diaphoretic.   HENT:      Head: Normocephalic and atraumatic.      Nose: Nose normal.      Mouth/Throat:      Mouth: Mucous membranes are moist.   Eyes:      Conjunctiva/sclera: Conjunctivae normal.   Cardiovascular:      Rate and Rhythm: Normal rate and regular rhythm.      Pulses: Normal pulses.   Pulmonary:      Effort: Pulmonary effort is normal.      Breath sounds: Normal breath sounds.   Abdominal:      General: Abdomen is flat. There is no distension.      Tenderness: There is no abdominal tenderness.   Musculoskeletal:         General: No swelling. Normal range  of motion.      Cervical back: Normal range of motion and neck supple.   Skin:     General: Skin is warm and dry.   Neurological:      General: No focal deficit present.      Mental Status: She is alert and oriented to person, place, and time.   Psychiatric:         Mood and Affect: Mood normal.         Procedures           ED Course  ED Course as of 11/10/22 2249   Thu Nov 10, 2022   2234 CBC & Differential(!) []   2246 CT Abdomen Pelvis With Contrast []   2247 Patient has not had any rectal bleeding since arriving in the emergency room.  She has had 2 bouts of diarrhea after drinking the p.o. contrast.    I spoke with Dr. Marcial general surgery who states that she knows this patient well.  We discussed patient's lab and radiological findings as well as the fact that she has had no bleeding since arriving emergency room.  She feels that patient can follow-up as scheduled.    I discussed lab and radiological findings with patient and patient's family as well as the discussion I had with general surgery.  Patient is understanding and agrees with plan of DC to home with follow-up as scheduled.  Return to emergency room if rectal bleeding returns. []      ED Course User Index  [] Dagoberto Feldman MD                                           MDM  Number of Diagnoses or Management Options  Risk of Complications, Morbidity, and/or Mortality  Presenting problems: moderate  Diagnostic procedures: moderate  Management options: moderate        Final diagnoses:   Rectal bleeding       ED Disposition  ED Disposition     ED Disposition   Discharge    Condition   Stable    Comment   --             Dennis Hung MD  [address] [facility] [address] [phone]    Schedule an appointment as soon as possible for a visit   As needed    [facility] Emergency Department  [address] [facility] [address] [phone]  Go to   As needed    Angy Potts DO  [address]  [address] [id number] [phone]    Schedule an appointment as soon as possible for a visit            Medication List      No changes were made to your prescriptions during this visit.          Dagoberto Feldman MD  11/10/22 1278

## 2022-11-16 ENCOUNTER — READMISSION MANAGEMENT (OUTPATIENT)
Dept: CALL CENTER | Facility: HOSPITAL | Age: 72
End: 2022-11-16

## 2022-11-16 NOTE — OUTREACH NOTE
Medical Week 2 Survey    Flowsheet Row Responses   [facility] patient discharged from? [place]   Does the patient have one of the following disease processes/diagnoses(primary or secondary)? Other   Week 2 attempt successful? Yes   Call start time 1405   Discharge diagnosis Rectal bleed, Colonoscopy with polypectomy, Rectal examination under anesthesia with control of hemostasis    Call end time 1411   Person spoke with today (if not patient) and relationship patient   Meds reviewed with patient/caregiver? Yes   Does the patient have all medications ordered at discharge? N/A   Is the patient taking all medications as directed (includes completed medication regime)? Yes   Does the patient have a primary care provider?  Yes   Has the patient kept scheduled appointments due by today? Yes   Has home health visited the patient within 72 hours of discharge? N/A   Psychosocial issues? No   Did the patient receive a copy of their discharge instructions? Yes   Nursing interventions Reviewed instructions with patient   What is the patient's perception of their health status since discharge? Same  [Patient with return visit to ER last week with rectal bleeding. Patient scheduled for surgery 12/1 for excision rectal mass with Dr Wilson]   Is the patient/caregiver able to teach back signs and symptoms related to disease process for when to call PCP? Yes   Is the patient/caregiver able to teach back signs and symptoms related to disease process for when to call 911? Yes   Is the patient/caregiver able to teach back the hierarchy of who to call/visit for symptoms/problems? PCP, Specialist, Home health nurse, Urgent Care, ED, 911 Yes   If the patient is a current smoker, are they able to teach back resources for cessation? Not a smoker   Week 2 Call Completed? Yes   Is the patient interested in additional calls from an ambulatory ?  NOTE:  applies to high risk patients requiring additional follow-up. No   Revoked  No further contact(revokes)-requires comment   Graduated/Revoked comments pranay denies needs for further f/u calls. She has scheduled surgery on 12/1 and knows to seek treatment with further bleeding.           CARLOS HOLT - Registered Nurse

## 2022-11-29 ENCOUNTER — PRE-ADMISSION TESTING (OUTPATIENT)
Dept: PREADMISSION TESTING | Facility: HOSPITAL | Age: 72
End: 2022-11-29

## 2022-11-29 VITALS
DIASTOLIC BLOOD PRESSURE: 73 MMHG | HEART RATE: 78 BPM | SYSTOLIC BLOOD PRESSURE: 139 MMHG | HEIGHT: 62 IN | OXYGEN SATURATION: 97 % | RESPIRATION RATE: 16 BRPM | WEIGHT: 153.8 LBS | BODY MASS INDEX: 28.3 KG/M2 | TEMPERATURE: 97.9 F

## 2022-11-29 DIAGNOSIS — K62.1 RECTAL POLYP: ICD-10-CM

## 2022-11-29 LAB
ANION GAP SERPL CALCULATED.3IONS-SCNC: 11 MMOL/L (ref 5–15)
BUN SERPL-MCNC: 13 MG/DL (ref 8–23)
BUN/CREAT SERPL: 14.1 (ref 7–25)
CALCIUM SPEC-SCNC: 9.4 MG/DL (ref 8.6–10.5)
CHLORIDE SERPL-SCNC: 98 MMOL/L (ref 98–107)
CO2 SERPL-SCNC: 29 MMOL/L (ref 22–29)
CREAT SERPL-MCNC: 0.92 MG/DL (ref 0.57–1)
DEPRECATED RDW RBC AUTO: 46.2 FL (ref 37–54)
EGFRCR SERPLBLD CKD-EPI 2021: 65.5 ML/MIN/1.73
ERYTHROCYTE [DISTWIDTH] IN BLOOD BY AUTOMATED COUNT: 13.8 % (ref 12.3–15.4)
GLUCOSE SERPL-MCNC: 128 MG/DL (ref 65–99)
HCT VFR BLD AUTO: 41.7 % (ref 34–46.6)
HGB BLD-MCNC: 13.4 G/DL (ref 12–15.9)
MCH RBC QN AUTO: 29.3 PG (ref 26.6–33)
MCHC RBC AUTO-ENTMCNC: 32.1 G/DL (ref 31.5–35.7)
MCV RBC AUTO: 91.2 FL (ref 79–97)
PLATELET # BLD AUTO: 225 10*3/MM3 (ref 140–450)
PMV BLD AUTO: 10 FL (ref 6–12)
POTASSIUM SERPL-SCNC: 3.7 MMOL/L (ref 3.5–5.2)
QT INTERVAL: 405 MS
RBC # BLD AUTO: 4.57 10*6/MM3 (ref 3.77–5.28)
SODIUM SERPL-SCNC: 138 MMOL/L (ref 136–145)
WBC NRBC COR # BLD: 6.21 10*3/MM3 (ref 3.4–10.8)

## 2022-11-29 PROCEDURE — 93010 ELECTROCARDIOGRAM REPORT: CPT | Performed by: INTERNAL MEDICINE

## 2022-11-29 PROCEDURE — 80048 BASIC METABOLIC PNL TOTAL CA: CPT

## 2022-11-29 PROCEDURE — 93005 ELECTROCARDIOGRAM TRACING: CPT

## 2022-11-29 PROCEDURE — 36415 COLL VENOUS BLD VENIPUNCTURE: CPT

## 2022-11-29 PROCEDURE — 85027 COMPLETE CBC AUTOMATED: CPT

## 2022-11-29 RX ORDER — PYRAZINAMIDE 500 MG/1
1 TABLET ORAL EVERY 6 HOURS PRN
COMMUNITY
Start: 2022-11-14 | End: 2022-12-01 | Stop reason: HOSPADM

## 2022-12-01 ENCOUNTER — ANESTHESIA (OUTPATIENT)
Dept: PERIOP | Facility: HOSPITAL | Age: 72
End: 2022-12-01

## 2022-12-01 ENCOUNTER — HOSPITAL ENCOUNTER (OUTPATIENT)
Facility: HOSPITAL | Age: 72
Setting detail: HOSPITAL OUTPATIENT SURGERY
Discharge: HOME | End: 2022-12-01
Attending: COLON & RECTAL SURGERY | Admitting: COLON & RECTAL SURGERY

## 2022-12-01 ENCOUNTER — ANESTHESIA EVENT (OUTPATIENT)
Dept: PERIOP | Facility: HOSPITAL | Age: 72
End: 2022-12-01

## 2022-12-01 VITALS
DIASTOLIC BLOOD PRESSURE: 66 MMHG | HEIGHT: 62 IN | TEMPERATURE: 97.7 F | BODY MASS INDEX: 28.13 KG/M2 | OXYGEN SATURATION: 99 % | SYSTOLIC BLOOD PRESSURE: 122 MMHG | HEART RATE: 72 BPM | RESPIRATION RATE: 16 BRPM

## 2022-12-01 DIAGNOSIS — K62.1 RECTAL POLYP: ICD-10-CM

## 2022-12-01 LAB
GLUCOSE BLDC GLUCOMTR-MCNC: 158 MG/DL (ref 70–130)
GLUCOSE BLDC GLUCOMTR-MCNC: 159 MG/DL (ref 70–130)

## 2022-12-01 PROCEDURE — 45171 EXC RECT TUM TRANSANAL PART: CPT | Performed by: COLON & RECTAL SURGERY

## 2022-12-01 PROCEDURE — 25010000002 PROPOFOL 10 MG/ML EMULSION: Performed by: NURSE ANESTHETIST, CERTIFIED REGISTERED

## 2022-12-01 PROCEDURE — 82962 GLUCOSE BLOOD TEST: CPT

## 2022-12-01 PROCEDURE — 25010000002 ONDANSETRON PER 1 MG: Performed by: NURSE ANESTHETIST, CERTIFIED REGISTERED

## 2022-12-01 PROCEDURE — 88305 TISSUE EXAM BY PATHOLOGIST: CPT | Performed by: COLON & RECTAL SURGERY

## 2022-12-01 PROCEDURE — 45171 EXC RECT TUM TRANSANAL PART: CPT | Performed by: PHYSICIAN ASSISTANT

## 2022-12-01 PROCEDURE — 82962 GLUCOSE BLOOD TEST: CPT | Performed by: FAMILY MEDICINE

## 2022-12-01 PROCEDURE — 25010000002 CEFAZOLIN IN DEXTROSE 2-4 GM/100ML-% SOLUTION: Performed by: COLON & RECTAL SURGERY

## 2022-12-01 PROCEDURE — 25010000002 FENTANYL CITRATE (PF) 50 MCG/ML SOLUTION: Performed by: NURSE ANESTHETIST, CERTIFIED REGISTERED

## 2022-12-01 PROCEDURE — 25010000002 SUCCINYLCHOLINE PER 20 MG: Performed by: NURSE ANESTHETIST, CERTIFIED REGISTERED

## 2022-12-01 RX ORDER — SUCCINYLCHOLINE CHLORIDE 20 MG/ML
INJECTION INTRAMUSCULAR; INTRAVENOUS AS NEEDED
Status: DISCONTINUED | OUTPATIENT
Start: 2022-12-01 | End: 2022-12-01 | Stop reason: SURG

## 2022-12-01 RX ORDER — LIDOCAINE HYDROCHLORIDE 40 MG/ML
SOLUTION TOPICAL AS NEEDED
Status: DISCONTINUED | OUTPATIENT
Start: 2022-12-01 | End: 2022-12-01 | Stop reason: SURG

## 2022-12-01 RX ORDER — METOPROLOL SUCCINATE 50 MG/1
50 TABLET, EXTENDED RELEASE ORAL ONCE
Status: COMPLETED | OUTPATIENT
Start: 2022-12-01 | End: 2022-12-01

## 2022-12-01 RX ORDER — HYDROMORPHONE HYDROCHLORIDE 1 MG/ML
0.5 INJECTION, SOLUTION INTRAMUSCULAR; INTRAVENOUS; SUBCUTANEOUS
Status: DISCONTINUED | OUTPATIENT
Start: 2022-12-01 | End: 2022-12-01 | Stop reason: HOSPADM

## 2022-12-01 RX ORDER — LABETALOL HYDROCHLORIDE 5 MG/ML
5 INJECTION, SOLUTION INTRAVENOUS
Status: DISCONTINUED | OUTPATIENT
Start: 2022-12-01 | End: 2022-12-01 | Stop reason: HOSPADM

## 2022-12-01 RX ORDER — METRONIDAZOLE 500 MG/100ML
500 INJECTION, SOLUTION INTRAVENOUS ONCE
Status: COMPLETED | OUTPATIENT
Start: 2022-12-01 | End: 2022-12-01

## 2022-12-01 RX ORDER — DIPHENHYDRAMINE HCL 25 MG
25 CAPSULE ORAL
Status: DISCONTINUED | OUTPATIENT
Start: 2022-12-01 | End: 2022-12-01 | Stop reason: HOSPADM

## 2022-12-01 RX ORDER — MAGNESIUM HYDROXIDE 1200 MG/15ML
LIQUID ORAL AS NEEDED
Status: DISCONTINUED | OUTPATIENT
Start: 2022-12-01 | End: 2022-12-01 | Stop reason: HOSPADM

## 2022-12-01 RX ORDER — LIDOCAINE HYDROCHLORIDE 20 MG/ML
INJECTION, SOLUTION INFILTRATION; PERINEURAL AS NEEDED
Status: DISCONTINUED | OUTPATIENT
Start: 2022-12-01 | End: 2022-12-01 | Stop reason: SURG

## 2022-12-01 RX ORDER — POLYETHYLENE GLYCOL 3350 17 G/17G
17 POWDER, FOR SOLUTION ORAL 2 TIMES DAILY
Start: 2022-12-01

## 2022-12-01 RX ORDER — HYDROCODONE BITARTRATE AND ACETAMINOPHEN 5; 325 MG/1; MG/1
1 TABLET ORAL ONCE AS NEEDED
Status: DISCONTINUED | OUTPATIENT
Start: 2022-12-01 | End: 2022-12-01 | Stop reason: HOSPADM

## 2022-12-01 RX ORDER — HYDRALAZINE HYDROCHLORIDE 20 MG/ML
5 INJECTION INTRAMUSCULAR; INTRAVENOUS
Status: DISCONTINUED | OUTPATIENT
Start: 2022-12-01 | End: 2022-12-01 | Stop reason: HOSPADM

## 2022-12-01 RX ORDER — DIPHENHYDRAMINE HYDROCHLORIDE 50 MG/ML
12.5 INJECTION INTRAMUSCULAR; INTRAVENOUS
Status: DISCONTINUED | OUTPATIENT
Start: 2022-12-01 | End: 2022-12-01 | Stop reason: HOSPADM

## 2022-12-01 RX ORDER — SODIUM CHLORIDE 0.9 % (FLUSH) 0.9 %
10 SYRINGE (ML) INJECTION AS NEEDED
Status: DISCONTINUED | OUTPATIENT
Start: 2022-12-01 | End: 2022-12-01 | Stop reason: HOSPADM

## 2022-12-01 RX ORDER — SODIUM CHLORIDE 0.9 % (FLUSH) 0.9 %
10 SYRINGE (ML) INJECTION EVERY 12 HOURS SCHEDULED
Status: DISCONTINUED | OUTPATIENT
Start: 2022-12-01 | End: 2022-12-01 | Stop reason: HOSPADM

## 2022-12-01 RX ORDER — PROPOFOL 10 MG/ML
VIAL (ML) INTRAVENOUS AS NEEDED
Status: DISCONTINUED | OUTPATIENT
Start: 2022-12-01 | End: 2022-12-01 | Stop reason: SURG

## 2022-12-01 RX ORDER — FENTANYL CITRATE 50 UG/ML
INJECTION, SOLUTION INTRAMUSCULAR; INTRAVENOUS AS NEEDED
Status: DISCONTINUED | OUTPATIENT
Start: 2022-12-01 | End: 2022-12-01 | Stop reason: SURG

## 2022-12-01 RX ORDER — SODIUM CHLORIDE, SODIUM LACTATE, POTASSIUM CHLORIDE, CALCIUM CHLORIDE 600; 310; 30; 20 MG/100ML; MG/100ML; MG/100ML; MG/100ML
INJECTION, SOLUTION INTRAVENOUS CONTINUOUS PRN
Status: DISCONTINUED | OUTPATIENT
Start: 2022-12-01 | End: 2022-12-01 | Stop reason: SURG

## 2022-12-01 RX ORDER — ONDANSETRON 4 MG/1
4 TABLET, FILM COATED ORAL ONCE AS NEEDED
Status: DISCONTINUED | OUTPATIENT
Start: 2022-12-01 | End: 2022-12-01 | Stop reason: HOSPADM

## 2022-12-01 RX ORDER — LIDOCAINE 50 MG/G
1 OINTMENT TOPICAL EVERY 4 HOURS PRN
Qty: 35.44 G | Refills: 4 | Status: SHIPPED | OUTPATIENT
Start: 2022-12-01 | End: 2022-12-31

## 2022-12-01 RX ORDER — ROCURONIUM BROMIDE 10 MG/ML
INJECTION, SOLUTION INTRAVENOUS AS NEEDED
Status: DISCONTINUED | OUTPATIENT
Start: 2022-12-01 | End: 2022-12-01 | Stop reason: SURG

## 2022-12-01 RX ORDER — NALOXONE HCL 0.4 MG/ML
0.2 VIAL (ML) INJECTION AS NEEDED
Status: DISCONTINUED | OUTPATIENT
Start: 2022-12-01 | End: 2022-12-01 | Stop reason: HOSPADM

## 2022-12-01 RX ORDER — FLUMAZENIL 0.1 MG/ML
0.2 INJECTION INTRAVENOUS AS NEEDED
Status: DISCONTINUED | OUTPATIENT
Start: 2022-12-01 | End: 2022-12-01 | Stop reason: HOSPADM

## 2022-12-01 RX ORDER — SODIUM CHLORIDE, SODIUM LACTATE, POTASSIUM CHLORIDE, CALCIUM CHLORIDE 600; 310; 30; 20 MG/100ML; MG/100ML; MG/100ML; MG/100ML
100 INJECTION, SOLUTION INTRAVENOUS CONTINUOUS
Status: DISCONTINUED | OUTPATIENT
Start: 2022-12-01 | End: 2022-12-01 | Stop reason: HOSPADM

## 2022-12-01 RX ORDER — ONDANSETRON 2 MG/ML
INJECTION INTRAMUSCULAR; INTRAVENOUS AS NEEDED
Status: DISCONTINUED | OUTPATIENT
Start: 2022-12-01 | End: 2022-12-01 | Stop reason: SURG

## 2022-12-01 RX ORDER — CEFAZOLIN SODIUM 2 G/100ML
2 INJECTION, SOLUTION INTRAVENOUS ONCE
Status: COMPLETED | OUTPATIENT
Start: 2022-12-01 | End: 2022-12-01

## 2022-12-01 RX ORDER — IPRATROPIUM BROMIDE AND ALBUTEROL SULFATE 2.5; .5 MG/3ML; MG/3ML
3 SOLUTION RESPIRATORY (INHALATION) ONCE AS NEEDED
Status: DISCONTINUED | OUTPATIENT
Start: 2022-12-01 | End: 2022-12-01 | Stop reason: HOSPADM

## 2022-12-01 RX ORDER — HYDROCODONE BITARTRATE AND ACETAMINOPHEN 5; 325 MG/1; MG/1
TABLET ORAL
Qty: 24 TABLET | Refills: 0 | Status: SHIPPED | OUTPATIENT
Start: 2022-12-01

## 2022-12-01 RX ORDER — FENTANYL CITRATE 50 UG/ML
50 INJECTION, SOLUTION INTRAMUSCULAR; INTRAVENOUS
Status: DISCONTINUED | OUTPATIENT
Start: 2022-12-01 | End: 2022-12-01 | Stop reason: HOSPADM

## 2022-12-01 RX ORDER — EPHEDRINE SULFATE 50 MG/ML
5 INJECTION, SOLUTION INTRAVENOUS ONCE AS NEEDED
Status: DISCONTINUED | OUTPATIENT
Start: 2022-12-01 | End: 2022-12-01 | Stop reason: HOSPADM

## 2022-12-01 RX ORDER — ONDANSETRON 2 MG/ML
4 INJECTION INTRAMUSCULAR; INTRAVENOUS ONCE AS NEEDED
Status: DISCONTINUED | OUTPATIENT
Start: 2022-12-01 | End: 2022-12-01 | Stop reason: HOSPADM

## 2022-12-01 RX ADMIN — CEFAZOLIN SODIUM 2 G: 2 INJECTION, SOLUTION INTRAVENOUS at 11:09

## 2022-12-01 RX ADMIN — SUCCINYLCHOLINE CHLORIDE 100 MG: 20 INJECTION, SOLUTION INTRAMUSCULAR; INTRAVENOUS; PARENTERAL at 11:29

## 2022-12-01 RX ADMIN — PROPOFOL 100 MG: 10 INJECTION, EMULSION INTRAVENOUS at 11:29

## 2022-12-01 RX ADMIN — ROCURONIUM BROMIDE 5 MG: 50 INJECTION INTRAVENOUS at 11:28

## 2022-12-01 RX ADMIN — FENTANYL CITRATE 25 MCG: 50 INJECTION INTRAMUSCULAR; INTRAVENOUS at 11:53

## 2022-12-01 RX ADMIN — LIDOCAINE HYDROCHLORIDE 50 MG: 20 INJECTION, SOLUTION INFILTRATION; PERINEURAL at 11:28

## 2022-12-01 RX ADMIN — LIDOCAINE HYDROCHLORIDE 1 EACH: 40 SOLUTION TOPICAL at 11:30

## 2022-12-01 RX ADMIN — METRONIDAZOLE 500 MG: 500 INJECTION, SOLUTION INTRAVENOUS at 11:09

## 2022-12-01 RX ADMIN — SODIUM CHLORIDE, POTASSIUM CHLORIDE, SODIUM LACTATE AND CALCIUM CHLORIDE: 600; 310; 30; 20 INJECTION, SOLUTION INTRAVENOUS at 11:14

## 2022-12-01 RX ADMIN — METOPROLOL SUCCINATE 50 MG: 50 TABLET, FILM COATED, EXTENDED RELEASE ORAL at 09:11

## 2022-12-01 RX ADMIN — ONDANSETRON 4 MG: 2 INJECTION INTRAMUSCULAR; INTRAVENOUS at 12:06

## 2022-12-01 RX ADMIN — FENTANYL CITRATE 25 MCG: 50 INJECTION INTRAMUSCULAR; INTRAVENOUS at 12:05

## 2022-12-01 NOTE — ANESTHESIA PROCEDURE NOTES
Airway  Urgency: elective    Date/Time: 12/1/2022 11:30 AM  Airway not difficult    General Information and Staff    Patient location during procedure: OR  Anesthesiologist: Herrera Milton MD  CRNA/CAA: Lexus Irizarry CRNA    Indications and Patient Condition  Indications for airway management: airway protection    Preoxygenated: yes  MILS maintained throughout  Mask difficulty assessment: 1 - vent by mask    Final Airway Details  Final airway type: endotracheal airway      Successful airway: ETT  Cuffed: yes   Successful intubation technique: direct laryngoscopy  Facilitating devices/methods: anterior pressure/BURP  Endotracheal tube insertion site: oral  Blade: Shannon  Blade size: 3  ETT size (mm): 7.0  Cormack-Lehane Classification: grade IIa - partial view of glottis  Placement verified by: chest auscultation and capnometry   Cuff volume (mL): 4  Measured from: lips  ETT/EBT  to lips (cm): 20  Number of attempts at approach: 1  Assessment: lips, teeth, and gum same as pre-op and atraumatic intubation    Additional Comments  Atraumatic ET Tube placement.  Teeth as pre-op. BLEBS.  -ABD sounds.  +ET CO2.  Secured to face

## 2022-12-01 NOTE — ADDENDUM NOTE
Addendum  created 12/01/22 1438 by Melanie Jones MD    Review and Sign - Ready for Procedure, Review and Sign - Signed

## 2022-12-01 NOTE — ANESTHESIA PREPROCEDURE EVALUATION
Anesthesia Evaluation     Patient summary reviewed and Nursing notes reviewed   NPO Solid Status: > 8 hours  NPO Liquid Status: > 2 hours           Airway   Mallampati: II  TM distance: >3 FB  Neck ROM: full  No difficulty expected  Dental - normal exam     Pulmonary - normal exam    breath sounds clear to auscultation  (+) COPD mild,   Cardiovascular - normal exam  Exercise tolerance: good (4-7 METS)    ECG reviewed  Patient on routine beta blocker and Beta blocker given within 24 hours of surgery    (+) hypertension, hyperlipidemia,       Neuro/Psych  GI/Hepatic/Renal/Endo    (+)  GERD,  diabetes mellitus type 2,     Musculoskeletal     Abdominal    Substance History      OB/GYN          Other   arthritis,                      Anesthesia Plan    ASA 3     general     intravenous induction     Anesthetic plan, risks, benefits, and alternatives have been provided, discussed and informed consent has been obtained with: patient.    Plan discussed with CRNA.        CODE STATUS:

## 2022-12-01 NOTE — DISCHARGE INSTRUCTIONS
Dr. Stephanie Wilson  [address] [facility]  [place] [id number] [phone]      Discharge Instructions for Rectal Mass Excision     Go home, rest and take it easy today; however, you should get up and move about several times today to reduce the risk of developing a clot in your legs.      You may experience some dizziness or memory loss from the anesthesia.  This may last for the next 24 hours.  Someone should plan on staying with you for the first 24 hours for your safety.    Do not make any important legal decisions or sign any legal papers for the next 24 hours.      Eat and drink lightly today.  Start off with liquids, jello, soup, crackers or other bland foods at first. Please drink plenty of fluids. You may advance your diet tomorrow as tolerated as long as you do not experience any nausea or vomiting.     Some patients will have packing in their rectum.  It should come out will your first bowel movement.  You may remove it sooner yourself if it bothers you.  If it comes out on its own before your first bowel movement, do not worry about it.  It does not need to be replaced.      Begin your sitz baths tomorrow.    The best method of pain relief is a sitz bath (sitting in a tub or warm water) at least 3 times daily for 10 minutes.  This helps to reduce pain and aids with hygiene/drainage.  The drainage may have an unpleasant odor.  This is not unexpected and should be controlled with baths and showers.  If the skin around the anal area becomes irritated, you may apply Vaseline, A&D ointment or a similar barrier cream to the area.       Bleeding and drainage are to be expected and may persist for as long as 2-4 weeks.  Bleeding may occur with your bowel movements as well.  Wear a cotton liner such as a Kotex pad or a panty liner inside your underwear to protect your clothing.       You have received a prescription for a narcotic pain medicine, as you will have pain following surgery.   You will not be  totally pain free, but your pain medicine should make the pain tolerable.  Please take your pain medicine as prescribed and always take your pills with food to prevent nausea. Your pain may persist for 1-3 weeks. If you are having severe pain that cannot be controlled by the pain medicine, please contact me.  Typically, patients with anal fissures will have less pain than those with hemorrhoids.      The goal is for your bowel movements to be soft which will help to minimize pain.  The pain medicine used to keep your comfortable may also cause some constipation so I recommend the following:    Miralax (17 grams)--1 capfull every day starting the day after surgery.    Keep taking fiber everyday (Citrucel, Metamucil, or Fiber-con) as directed.    If you are unable to have a bowel movement by 2 days after surgery, try Milk of Magnesia, Magnesium Citrate, or Colace.  If still unable to have a bowel movement, call the office at [phone]      No driving for 24 hours and for as long as you are taking your prescription pain medicine.  You may resume your activities gradually.       You will need to call the office at [phone] to schedule a follow-up appointment in 10-21 days.    Remember to contact me for any of the following:    Fever > 100.5 degrees  Severe pain that cannot be controlled by taking your pain pills  Severe nausea or vomiting   Significant bleeding > 1/4 cup  Any other questions or concerns

## 2022-12-01 NOTE — ANESTHESIA POSTPROCEDURE EVALUATION
"Patient: Anita Neves    Procedure Summary     Date: 12/01/22 Room / Location: [facility] OR  / [facility] MAIN OR    Anesthesia Start: 1114 Anesthesia Stop: 1223    Procedure: RECTAL MASS EXCISION (Rectum) Diagnosis:       Rectal polyp      (Rectal polyp [K62.1])    Surgeons: Stephanie Wilson MD Provider: Herrera Milton MD    Anesthesia Type: general ASA Status: 3          Anesthesia Type: No value filed.    Vitals  Vitals Value Taken Time   /64 12/01/22 1316   Temp 36.5 °C (97.7 °F) 12/01/22 1219   Pulse 68 12/01/22 1320   Resp 16 12/01/22 1315   SpO2 94 % 12/01/22 1320   Vitals shown include unvalidated device data.        Post Anesthesia Care and Evaluation    Patient location during evaluation: bedside  Patient participation: complete - patient participated  Level of consciousness: awake and alert  Pain management: adequate    Airway patency: patent  Anesthetic complications: No anesthetic complications  PONV Status: controlled  Cardiovascular status: acceptable  Respiratory status: acceptable  Hydration status: acceptable    Comments: /63 (BP Location: Left arm, Patient Position: Lying)   Pulse 70   Temp 36.5 °C (97.7 °F) (Oral)   Resp 16   Ht 157.5 cm (62\")   LMP  (LMP Unknown)   SpO2 100%   BMI 28.13 kg/m²         "

## 2022-12-02 LAB
LAB AP CASE REPORT: NORMAL
PATH REPORT.FINAL DX SPEC: NORMAL
PATH REPORT.GROSS SPEC: NORMAL

## 2022-12-04 NOTE — OP NOTE
Surgeon: Stephanie Wilson MD    Surgical  Assistant: Elise Flores PA-C     Preoperative diagnosis: Rectal polyp [K62.1]    Post-Op Diagnosis Codes:     * Rectal polyp [K62.1]    Procedure: RECTAL MASS EXCISION, * Panel 2 does not exist *    Estimated Blood Loss: minimal    Specimens:   Specimens     ID Source Type Tests Collected By Collected At Frozen?    A Large Intestine, Rectum Tissue · TISSUE PATHOLOGY EXAM   Stephanie Wilson MD 12/1/22 1158 No    Description: right lateral rectal polyp         Order Name Source Comment Collection Info Order Time   TISSUE PATHOLOGY EXAM Large Intestine, Rectum  Collected By: Stephanie Wilson MD 12/1/2022 11:59 AM     Release to patient   Routine Release            Indication:  Anita Neves is a 72 y.o. female who comes in with Rectal polyp  Patient understands risks, benefits,and alternatives wishes to proceed.      Procedure Details:  Patient was brought to the operating room, SCDs in place, antibiotics infused. After general anesthesia was achieved, patient was placed prone on the operating room table, buttocks were effaced with tape. She was prepped and draped in sterile fashion. 1% lidocaine with epinephrine, 0.25% Marcaine without was used as a local infiltration with perineum block. I then entered the anal canal  using a lighted [place]. I then documented rectal polyp at the dentate line from previous op note. It was very minimal. I reread the op note from the circulator's computer and it was documented that it was at the dentate line and it was taken piecemeal and not thought to have been completely removed. The patient was then retaken to the OR for bleeding and the area was over sewn. There was an area also identified in the right lateral side that had scar tissue and possibly a little residual polyp. I used 1% lidocaine with epinephrine and Marcaine mixed to lift this area and then took some of the squamous epithelium of the anal canal along with  about 1 cm of rectal mucosa and marked that out around where the scar tissue was. I did a partial thickness transanal excision of this scar and used electrocautery and then Enseal along the rectal mucosa to do it. I sent the specimen off and then closed the incision in an interrupted fashion with a 2-0 Vicryl. All instrument, lap and needle counts were correct. The patient was stable throughout the entire case and was taken to recovery.         Assistant: Elise Flores PA-C was responsible for performing the following activities: Retraction, Suction, Irrigation, Suturing, Closing and Placing Dressing and their skilled assistance was necessary for the success of this case

## 2024-02-15 ENCOUNTER — OFFICE VISIT (OUTPATIENT)
Dept: CARDIOLOGY | Facility: CLINIC | Age: 74
End: 2024-02-15
Payer: MEDICARE

## 2024-02-15 VITALS
SYSTOLIC BLOOD PRESSURE: 128 MMHG | HEART RATE: 71 BPM | HEIGHT: 62 IN | DIASTOLIC BLOOD PRESSURE: 60 MMHG | WEIGHT: 151.9 LBS | BODY MASS INDEX: 27.95 KG/M2

## 2024-02-15 DIAGNOSIS — R06.02 SOB (SHORTNESS OF BREATH): ICD-10-CM

## 2024-02-15 DIAGNOSIS — R07.89 OTHER CHEST PAIN: ICD-10-CM

## 2024-02-15 DIAGNOSIS — E78.2 MIXED HYPERLIPIDEMIA: ICD-10-CM

## 2024-02-15 DIAGNOSIS — I10 ESSENTIAL HYPERTENSION: Primary | ICD-10-CM

## 2024-02-15 DIAGNOSIS — R00.2 PALPITATIONS: ICD-10-CM

## 2024-02-15 RX ORDER — OMEPRAZOLE 40 MG/1
40 CAPSULE, DELAYED RELEASE ORAL DAILY
COMMUNITY

## 2024-02-15 RX ORDER — ACETAMINOPHEN AND CODEINE PHOSPHATE 300; 30 MG/1; MG/1
1 TABLET ORAL DAILY
COMMUNITY
Start: 2024-01-23

## 2024-02-15 RX ORDER — GLIPIZIDE 10 MG/1
10 TABLET, FILM COATED, EXTENDED RELEASE ORAL DAILY
COMMUNITY

## 2024-03-14 ENCOUNTER — TELEPHONE (OUTPATIENT)
Dept: CARDIOLOGY | Facility: CLINIC | Age: 74
End: 2024-03-14
Payer: MEDICARE

## 2024-03-14 NOTE — TELEPHONE ENCOUNTER
Patient called about holter results.  It looks like it was not placed here so I asked the patient and she said this was done at [facility].  I called out there any they faxed the results/report to me.  I received the results and placed in Dr. Frey mailbox.  I asked the patient to give him until next week to review.    Sandy Ham RN  [facility] Triage  03/14/24 11:22 EDT
